# Patient Record
Sex: MALE | Race: BLACK OR AFRICAN AMERICAN | NOT HISPANIC OR LATINO | URBAN - METROPOLITAN AREA
[De-identification: names, ages, dates, MRNs, and addresses within clinical notes are randomized per-mention and may not be internally consistent; named-entity substitution may affect disease eponyms.]

---

## 2020-08-28 ENCOUNTER — OFFICE VISIT (OUTPATIENT)
Dept: URGENT CARE | Facility: CLINIC | Age: 45
End: 2020-08-28
Payer: MEDICAID

## 2020-08-28 VITALS
TEMPERATURE: 98 F | WEIGHT: 170 LBS | RESPIRATION RATE: 17 BRPM | DIASTOLIC BLOOD PRESSURE: 100 MMHG | HEIGHT: 71 IN | SYSTOLIC BLOOD PRESSURE: 143 MMHG | BODY MASS INDEX: 23.8 KG/M2 | HEART RATE: 71 BPM | OXYGEN SATURATION: 95 %

## 2020-08-28 DIAGNOSIS — R03.0 ELEVATED BLOOD PRESSURE READING: ICD-10-CM

## 2020-08-28 DIAGNOSIS — Z72.0 TOBACCO USE: ICD-10-CM

## 2020-08-28 DIAGNOSIS — J02.9 SORE THROAT: ICD-10-CM

## 2020-08-28 DIAGNOSIS — H92.03 OTALGIA OF BOTH EARS: ICD-10-CM

## 2020-08-28 DIAGNOSIS — J40 BRONCHITIS: Primary | ICD-10-CM

## 2020-08-28 LAB
CTP QC/QA: YES
CTP QC/QA: YES
MOLECULAR STREP A: NEGATIVE
SARS-COV-2 RDRP RESP QL NAA+PROBE: NEGATIVE

## 2020-08-28 PROCEDURE — 87651 STREP A DNA AMP PROBE: CPT | Mod: QW,S$GLB,, | Performed by: NURSE PRACTITIONER

## 2020-08-28 PROCEDURE — 99214 OFFICE O/P EST MOD 30 MIN: CPT | Mod: 25,S$GLB,, | Performed by: NURSE PRACTITIONER

## 2020-08-28 PROCEDURE — U0002 COVID-19 LAB TEST NON-CDC: HCPCS | Mod: QW,S$GLB,, | Performed by: NURSE PRACTITIONER

## 2020-08-28 PROCEDURE — 99214 PR OFFICE/OUTPT VISIT, EST, LEVL IV, 30-39 MIN: ICD-10-PCS | Mod: 25,S$GLB,, | Performed by: NURSE PRACTITIONER

## 2020-08-28 PROCEDURE — 87651 POCT STREP A MOLECULAR: ICD-10-PCS | Mod: QW,S$GLB,, | Performed by: NURSE PRACTITIONER

## 2020-08-28 PROCEDURE — U0002 PR SARS-COV-2 COVID-19 ANY TECHNIQUE, MULT TYPE/SUBTYPE/TARGET: ICD-10-PCS | Mod: QW,S$GLB,, | Performed by: NURSE PRACTITIONER

## 2020-08-28 RX ORDER — AMOXICILLIN AND CLAVULANATE POTASSIUM 875; 125 MG/1; MG/1
1 TABLET, FILM COATED ORAL 2 TIMES DAILY
Qty: 20 TABLET | Refills: 0 | Status: SHIPPED | OUTPATIENT
Start: 2020-08-28 | End: 2020-09-07

## 2020-08-28 RX ORDER — ALBUTEROL SULFATE 90 UG/1
2 POWDER, METERED RESPIRATORY (INHALATION) EVERY 4 HOURS PRN
Qty: 1 EACH | Refills: 0 | Status: SHIPPED | OUTPATIENT
Start: 2020-08-28

## 2020-08-28 NOTE — PATIENT INSTRUCTIONS
"Patient Instructions      Please follow up with your Primary care provider within 2-5 days if your signs and symptoms have not resolved or worsen.  The usual course of cold symptoms are 10-14 days.      If your condition worsens or fails to improve we recommend that you receive another evaluation at the emergency room immediately or contact your primary medical clinic to discuss your concerns.      You must understand that you have received an Urgent Care treatment only and that you may be released before all of your medical problems are known or treated.   You, the patient, will arrange for follow up care as instructed.      Tylenol or Ibuprofen can also be used as directed for pain/fever unless you have an allergy to them or medical condition such as stomach ulcers, kidney or liver disease or blood thinners etc for which you should not be taking these type of medications.      Take over the counter cough medication as directed as needed for cough.  You should avoid medications with pseudoephedrine or phenylephrine (any medication with "D") if you have high blood pressure as this can cause an elevation in your blood pressure. Instead consider Corcidin HBP as needed to prevent an elevated blood pressure.      Natural remedies of symptoms (as needed) include humidification, saline nasal sprays, and/or steamy showers.  Increase fluids, warm tea with honey, cough drops as needed.  You may also use salt water gargles for sore throat.     IF you received a steroid shot today - As discussed, this can elevate your blood pressure, elevate your blood sugar, water weight gain, nervous energy, redness to the face and dimpling of the skin at the injection site.      OVER THE COUNTER RECOMMENDATIONS/SUGGESTIONS.    Make sure to stay well hydrated.    Use Nasal Saline to mechanically move any post nasal drip from your eustachian tube or from the back of your throat.    Use warm salt water gargles to ease your throat pain. Warm " salt water gargles as needed for sore throat-  1/2 tsp salt to 1 cup warm water, gargle as desired.    Use an antihistamine such as Claritin, Zyrtec or Allegra to dry you out.     Use pseudoephedrine (behind the counter) to decongest. Pseudoephedrine  30 mg up to 240 mg /day. It can raise your blood pressure and give you palpitations.    Use mucinex (guaifenisin) to break up mucous up to 2400mg/day to loosen any mucous.   The mucinex DM pill has a cough suppressant that can be sedating. It can be used at night to stop the tickle at the back of your throat.  You can use Mucinex D (it has guaifenesin and a high dose of pseudoephedrine) in the mornings to help decongest.      Use Afrin in each nare for no longer than 3 days, as it is addictive. It can also dry out your mucous membranes and cause elevated blood pressure. This is especially useful if you are flying.    Use Flonase 1-2 sprays/nostril per day. It is a local acting steroid nasal spray, if you develop a bloody nose, stop using the medication immediately.    Sometimes Nyquil at night is beneficial to help you get some rest, however it is sedating and it does have an antihistamine, and tylenol.    Honey is a natural cough suppressant that can be used.    Tylenol up to 4,000 mg a day is safe for short periods and can be used for body aches, pain, and fever. However in high doses and prolonged use it can cause liver irritation.    Ibuprofen is a non-steroidal anti-inflammatory that can be used for body aches, pain, and fever.However it can also cause stomach irritation if over used.    Planning to Quit Smoking  Your healthcare provider may have told you that you need to give up tobacco. Only you can decide if and when you are ready to quit. Quitting is hard to do. But the benefits will be worth it. When you  decide to quit, come up with a plan thats right for you. Discuss your plan with your healthcare provider. And talk to your provider about medicines to help  you quit.    Line up support  To quit smoking, youll need a plan and some help. Pick a date within the next 2 to 4 weeks to quit. Use the time between now and that date to arrange for support.  · Classes and counselors. Quit-smoking classes  people like you through the process. Get to know others in a class, and support each other beyond the class. Telephone counseling also helps you keep on track. Ask your healthcare provider, local hospital, or public health department to put you in touch with a class and a phone counselor.  · Family and friends. Tell your family and friends about your quit date. Ask them to support your change. If they smoke, arrange to see them in smoke-free places. Forbid smoking in your home and car.     Finding something to replace cigarettes may be hard to do. Be aware that some things you choose may be as harmful as cigarettes:  · Smokeless (chewing) tobacco is just as harmful as regular tobacco. Tobacco should not be used as a substitute for cigarettes.  · Herbal medicines or teas may affect how your body handles nicotine. Talk to your healthcare provider before using these products.  · E-cigarettes have less toxins than the smoke from a regular cigarette. But the FDA says that these devices may still have substances that can cause cancer. E-cigarettes are not well regulated. They have not been studied enough to know if they are a good aid to help you stop smoking. Talk with your healthcare provider before using these products.      Quit-smoking products  Many products can help you quit smoking. Some are prescription medicines that help curb your cravings and withdrawal symptoms. Other products slowly lessen the level of nicotine your body absorbs. Nicotine is the highly addictive substance found in cigarettes, cigars, and chewing tobacco. Nicotine replacement products can help get your body used to slowly decreasing amounts of nicotine after you quit smoking. These products include  a nicotine patch, gum, lozenge, nasal spray, and inhaler. Be sure you follow the directions for your medicine or product carefully. Your healthcare provider may tell you to start taking the prescription medicine a week before you plan to quit. Do not smoke while you use nicotine products. Doing so can be very harmful to your health.  For more information  · https://smokefree.gov/oobr-po-yj-expert  · National Cancer [place] Smoking Quitline: 877-44U-QUIT (186-905-9512)   Date Last Reviewed: 2/1/2017  © 5644-2445 [employer]. [address] [place] [address]. All rights reserved. This information is not intended as a substitute for professional medical care. Always follow your healthcare professional's instructions.        Bronchitis with Wheezing (Viral or Bacterial: Adult)    Bronchitis is an infection of the air passages. It often occurs during a cold and is usually caused by a virus. Symptoms include cough with mucus (phlegm) and low-grade fever. This illness is contagious during the first few days and is spread through the air by coughing and sneezing, or by direct contact (touching the sick person and then touching your own eyes, nose, or mouth).  If there is a lot of inflammation, air flow is restricted. The air passages may also go into spasm, especially if you have asthma. This causes wheezing and difficulty breathing even in people who do not have asthma.  Bronchitis usually lasts 7 to 14 days. The wheezing should improve with treatment during the first week. An inhaler is often prescribed to relax the air passages and stop wheezing. Antibiotics will be prescribed if your doctor thinks there is also a secondary bacterial infection.  Home care  · If symptoms are severe, rest at home for the first 2 to 3 days. When you go back to your usual activities, don't let yourself get too tired.  · Do not smoke. Also avoid being exposed to secondhand smoke.  · You may use over-the-counter  medicine to control fever or pain, unless another medicine was prescribed. Note: If you have chronic liver or kidney disease or have ever had a stomach ulcer or gastrointestinal bleeding, talk with your healthcare provider before using these medicines. Also talk to your provider if you are taking medicine to prevent blood clots.) Aspirin should never be given to anyone younger than 18 years of age who is ill with a viral infection or fever. It may cause severe liver or brain damage.  · Your appetite may be poor, so a light diet is fine. Avoid dehydration by drinking 6 to 8 glasses of fluids per day (such as water, soft drinks, sports drinks, juices, tea, or soup). Extra fluids will help loosen secretions in the nose and lungs.  · Over-the-counter cough, cold, and sore-throat medicines will not shorten the length of the illness, but they may be helpful to reduce symptoms. (Note: Do not use decongestants if you have high blood pressure.)  · If you were given an inhaler, use it exactly as directed. If you need to use it more often than prescribed, your condition may be worsening. If this happens, contact your healthcare provider.  · If prescribed, finish all antibiotic medicine, even if you are feeling better after only a few days.  Follow-up care  Follow up with your healthcare provider, or as advised. If you had an X-ray or ECG (electrocardiogram), a specialist will review it. You will be notified of any new findings that may affect your care.  Note: If you are age 65 or older, or if you have a chronic lung disease or condition that affects your immune system, or you smoke, talk to your healthcare provider about having a pneumococcal vaccinations and a yearly influenza vaccination (flu shot).  When to seek medical advice  Call your healthcare provider right away if any of these occur:  · Fever of 100.4°F (38°C) or higher  · Coughing up increasing amounts of colored sputum  · Weakness, drowsiness, headache, facial  pain, ear pain, or a stiff neck  Call 911, or get immediate medical care  Contact emergency services right away if any of these occur.  · Coughing up blood  · Worsening weakness, drowsiness, headache, or stiff neck  · Increased wheezing not helped with medication, shortness of breath, or pain with breathing  Date Last Reviewed: 9/13/2015 © 2000-2017 [email or web address]. [address] [place] [address], [place] [address]. All rights reserved. This information is not intended as a substitute for professional medical care. Always follow your healthcare professional's instructions.        Discharge Instructions for High Blood Pressure (Hypertension)  You have been diagnosed with high blood pressure (also called hypertension). This means the force of blood against your artery walls is too strong. It also means your heart is working hard to move blood. High blood pressure usually has no symptoms, but over time, it can damage your heart, blood vessels, eyes, kidneys, and other organs. With help from your doctor, you can manage your blood pressure and protect your health.  Taking medicine  · Learn to take your own blood pressure. Keep a record of your results. Ask your doctor which readings mean that you need medical attention.  · Take your blood pressure medicine exactly as directed. Dont skip doses. Missing doses can cause your blood pressure to get out of control.  · If you do miss a dose (or doses) check with your healthcare provider about what to do.  · Avoid medicine that contain heart stimulants, including over-the-counter drugs. Check for warnings about high blood pressure on the label. Ask the pharmacist before purchasing something you haven't used before  · Check with your doctor or pharmacist before taking a decongestant. Some decongestants can worsen high blood pressure.  Lifestyle changes  · Maintain a healthy weight. Get help to lose any extra pounds.  · Cut back on salt.  ¨ Limit canned, dried, packaged, and  "fast foods.  ¨ Dont add salt to your food at the table.  ¨ Season foods with herbs instead of salt when you cook.  ¨ Request no added salt when you go to a restaurant.  ¨ The American Heart Associations (AHA) "ideal" sodium intake recommendation is 1,500 milligrams per day.  However, since American's eat so much salt, the AHA says a positive change can occur by cutting back to even 2,400 milligrams of sodium a day.   · Follow the DASH (Dietary Approaches to Stop Hypertension) eating plan. This plan recommends vegetables, fruits, whole gains, and other heart healthy foods.  · Begin an exercise program. Ask your doctor how to get started. The American Heart Association recommends aerobic exercise 3 to 4 times a week for an average of 40 minutes at a time, with your doctor's approval. Simple activities like walking or gardening can help.  · Break the smoking habit. Enroll in a stop-smoking program to improve your chances of success. Ask your healthcare provider about programs and medicines to help you stop smoking.  · Limit drinks that contain caffeine (coffee, black or green tea, cola) to 2 per day.  · Never take stimulants such as amphetamines or cocaine; these drugs can be deadly for someone with high blood pressure.  · Control your stress. Learn stress-management techniques.  · Limit alcohol to no more than 1 drink a day for women and 2 drinks a day for men.  Follow-up care  Make a follow-up appointment as directed by our staff.     When to seek medical care  Call your doctor immediately or seek emergency care if you have any of the following:  · Chest pain or shortness of breath (call 911)  · Moderate to severe headache  · Weakness in the muscles of your face, arms, or legs  · Trouble speaking  · Extreme drowsiness  · Confusion  · Fainting or dizziness  · Pulsating or rushing sound in your ears  · Unexplained nosebleed  · Weakness, tingling, or numbness of your face, arms, or legs  · Change in vision  · Blood " pressure measured at home that is greater than 180/110   Date Last Reviewed: 4/27/2016  © 9081-8525 [employer] [address] [place] [address], [place] [address]. All rights reserved. This information is not intended as a substitute for professional medical care. Always follow your healthcare professional's instructions.        Bronchitis with Wheezing (Viral or Bacterial: Adult)    Bronchitis is an infection of the air passages. It often occurs during a cold and is usually caused by a virus. Symptoms include cough with mucus (phlegm) and low-grade fever. This illness is contagious during the first few days and is spread through the air by coughing and sneezing, or by direct contact (touching the sick person and then touching your own eyes, nose, or mouth).  If there is a lot of inflammation, air flow is restricted. The air passages may also go into spasm, especially if you have asthma. This causes wheezing and difficulty breathing even in people who do not have asthma.  Bronchitis usually lasts 7 to 14 days. The wheezing should improve with treatment during the first week. An inhaler is often prescribed to relax the air passages and stop wheezing. Antibiotics will be prescribed if your doctor thinks there is also a secondary bacterial infection.  Home care  · If symptoms are severe, rest at home for the first 2 to 3 days. When you go back to your usual activities, don't let yourself get too tired.  · Do not smoke. Also avoid being exposed to secondhand smoke.  · You may use over-the-counter medicine to control fever or pain, unless another medicine was prescribed. Note: If you have chronic liver or kidney disease or have ever had a stomach ulcer or gastrointestinal bleeding, talk with your healthcare provider before using these medicines. Also talk to your provider if you are taking medicine to prevent blood clots.) Aspirin should never be given to anyone younger than 18 years of age who is ill with a viral  infection or fever. It may cause severe liver or brain damage.  · Your appetite may be poor, so a light diet is fine. Avoid dehydration by drinking 6 to 8 glasses of fluids per day (such as water, soft drinks, sports drinks, juices, tea, or soup). Extra fluids will help loosen secretions in the nose and lungs.  · Over-the-counter cough, cold, and sore-throat medicines will not shorten the length of the illness, but they may be helpful to reduce symptoms. (Note: Do not use decongestants if you have high blood pressure.)  · If you were given an inhaler, use it exactly as directed. If you need to use it more often than prescribed, your condition may be worsening. If this happens, contact your healthcare provider.  · If prescribed, finish all antibiotic medicine, even if you are feeling better after only a few days.  Follow-up care  Follow up with your healthcare provider, or as advised. If you had an X-ray or ECG (electrocardiogram), a specialist will review it. You will be notified of any new findings that may affect your care.  Note: If you are age 65 or older, or if you have a chronic lung disease or condition that affects your immune system, or you smoke, talk to your healthcare provider about having a pneumococcal vaccinations and a yearly influenza vaccination (flu shot).  When to seek medical advice  Call your healthcare provider right away if any of these occur:  · Fever of 100.4°F (38°C) or higher  · Coughing up increasing amounts of colored sputum  · Weakness, drowsiness, headache, facial pain, ear pain, or a stiff neck  Call 911, or get immediate medical care  Contact emergency services right away if any of these occur.  · Coughing up blood  · Worsening weakness, drowsiness, headache, or stiff neck  · Increased wheezing not helped with medication, shortness of breath, or pain with breathing  Date Last Reviewed: 9/13/2015  © 1680-8712 [employer]. [address] [place] [address], [place] [address]. All  rights reserved. This information is not intended as a substitute for professional medical care. Always follow your healthcare professional's instructions.        Taking Your Blood Pressure  Blood pressure is the force of blood against the artery wall as it moves from the heart through the blood vessels. You can take your own blood pressure reading using a digital monitor. Take your readings the same each time, using the same arm. Take readings as often as your healthcare provider instructs.  About blood pressure monitors  Blood pressure monitors are designed for certain ages and cases. You can find monitors for older adults, for pregnant women, and for children. Make sure the one you choose is the right one for your age and situation.  The American Heart Association recommends an automatic cuff monitor that fits on your upper arm (bicep). The cuff should fit your arm size. A cuff thats too large or too small will not give an accurate reading. Measure around your upper arm to find your size.  Monitors that attach to your finger or wrist are not as accurate as monitors for your upper arm.  Ask your healthcare provider for help in choosing a monitor. Bring your monitor to your next provider visit if you need help in using it the correct way.  The steps below are general instructions for using an automatic digital monitor.  Step 1. Relax    · Take your blood pressure at the same time every day, such as in the morning or evening, or at the time your healthcare provider recommends.  · Wait at least a half-hour after smoking, eating, or exercising. Don't drink coffee, tea, soda, or other caffeinated beverages before checking your blood pressure.  · Sit comfortably at a table with both feet on the floor. Do not cross your legs or feet. Place the monitor near you.  · Rest for a few minutes before you begin.  Step 2. Wrap the cuff    · Place your arm on the table, palm up. Your arm should be at the level of your heart. Wrap  the cuff around your upper arm, just above your elbow. Its best done on bare skin, not over clothing. Most cuffs will indicate where the brachial artery (the blood vessel in the middle of the arm at the inner side of the elbow) should line up with the cuff. Look in your monitor's instruction booklet for an illustration. You can also bring your cuff to your healthcare provider and have them show you how to correctly place the cuff.  Step 3. Inflate the cuff    · Push the button that starts the pump.  · The cuff will tighten, then loosen.  · The numbers will change. When they stop changing, your blood pressure reading will appear.  · Take 2 or 3 readings one minute apart.  Step 4. Write down the results of each reading    · Write down your blood pressure numbers for each reading. Note the date and time. Keep your results in one place, such as a notebook. Even if your monitor has a built-in memory, keep a hard copy of the readings.  · Remove the cuff from your arm. Turn off the machine.  · Bring your blood pressure records with your healthcare providers at each visit.  · If you start a new blood pressure medicine, note the day you started the new medicine. Also note the day if you change the dose of your medicine. This information goes on your blood pressure recording sheet. This will help your healthcare provider monitor how well the medicine changes are working.  · Ask your healthcare provider what numbers should prompt you to call him or her. Also ask what numbers should prompt you to get help right away.  Date Last Reviewed: 11/1/2016  © 9039-7518 [email or web address] [address] [place] [address], [place] [address]. All rights reserved. This information is not intended as a substitute for professional medical care. Always follow your healthcare professional's instructions.        Discharge Instructions for High Blood Pressure (Hypertension)  You have been diagnosed with high blood pressure (also called  "hypertension). This means the force of blood against your artery walls is too strong. It also means your heart is working hard to move blood. High blood pressure usually has no symptoms, but over time, it can damage your heart, blood vessels, eyes, kidneys, and other organs. With help from your doctor, you can manage your blood pressure and protect your health.  Taking medicine  · Learn to take your own blood pressure. Keep a record of your results. Ask your doctor which readings mean that you need medical attention.  · Take your blood pressure medicine exactly as directed. Dont skip doses. Missing doses can cause your blood pressure to get out of control.  · If you do miss a dose (or doses) check with your healthcare provider about what to do.  · Avoid medicine that contain heart stimulants, including over-the-counter drugs. Check for warnings about high blood pressure on the label. Ask the pharmacist before purchasing something you haven't used before  · Check with your doctor or pharmacist before taking a decongestant. Some decongestants can worsen high blood pressure.  Lifestyle changes  · Maintain a healthy weight. Get help to lose any extra pounds.  · Cut back on salt.  ¨ Limit canned, dried, packaged, and fast foods.  ¨ Dont add salt to your food at the table.  ¨ Season foods with herbs instead of salt when you cook.  ¨ Request no added salt when you go to a restaurant.  ¨ The American Heart Associations (AHA) "ideal" sodium intake recommendation is 1,500 milligrams per day.  However, since American's eat so much salt, the AHA says a positive change can occur by cutting back to even 2,400 milligrams of sodium a day.   · Follow the DASH (Dietary Approaches to Stop Hypertension) eating plan. This plan recommends vegetables, fruits, whole gains, and other heart healthy foods.  · Begin an exercise program. Ask your doctor how to get started. The American Heart Association recommends aerobic exercise 3 to 4 " times a week for an average of 40 minutes at a time, with your doctor's approval. Simple activities like walking or gardening can help.  · Break the smoking habit. Enroll in a stop-smoking program to improve your chances of success. Ask your healthcare provider about programs and medicines to help you stop smoking.  · Limit drinks that contain caffeine (coffee, black or green tea, cola) to 2 per day.  · Never take stimulants such as amphetamines or cocaine; these drugs can be deadly for someone with high blood pressure.  · Control your stress. Learn stress-management techniques.  · Limit alcohol to no more than 1 drink a day for women and 2 drinks a day for men.  Follow-up care  Make a follow-up appointment as directed by our staff.     When to seek medical care  Call your doctor immediately or seek emergency care if you have any of the following:  · Chest pain or shortness of breath (call 911)  · Moderate to severe headache  · Weakness in the muscles of your face, arms, or legs  · Trouble speaking  · Extreme drowsiness  · Confusion  · Fainting or dizziness  · Pulsating or rushing sound in your ears  · Unexplained nosebleed  · Weakness, tingling, or numbness of your face, arms, or legs  · Change in vision  · Blood pressure measured at home that is greater than 180/110   Date Last Reviewed: 4/27/2016  © 5797-3058 [email or web address]. [address] [place] [address], [place] [address]. All rights reserved. This information is not intended as a substitute for professional medical care. Always follow your healthcare professional's instructions.

## 2020-08-28 NOTE — PROGRESS NOTES
"Subjective:       Patient ID: Grayson Zapata is a 45 y.o. male.    Vitals:  height is 5' 11" (1.803 m) and weight is 77.1 kg (170 lb).     Chief Complaint: Otalgia and Sinus Problem    HPI  ROS    Objective:      Physical Exam      Assessment:       No diagnosis found.    Plan:         There are no diagnoses linked to this encounter.       "

## 2020-08-28 NOTE — PROGRESS NOTES
"Subjective:       Patient ID: Grayson Zapata is a 45 y.o. male.    Vitals:  height is 5' 11" (1.803 m) and weight is 77.1 kg (170 lb). His temperature is 98.1 °F (36.7 °C). His blood pressure is 143/100 (abnormal) and his pulse is 71. His respiration is 17 and oxygen saturation is 95%.     Chief Complaint: Otalgia and Sinus Problem    ambulatory 45-year-old smoker with chief complaint of cough with shortness breath, sinus congestion, postnasal drip bilateral ear pain, and sore throat with painful lymph nodes. Patient stated that Wednesday night he started to experience some shortness of breath. He stated that he was using an inhaler to help with it and otc meds. He stated that now he has a drip that is causing sore throat and ear pain. Denies nausea, fever and body aches. He wants to join a smoking cessation plan - he states he has seen someone before but did Wellbutrin and it caused jittery feeling.     Otalgia   There is pain in the left ear. This is a new problem. The current episode started today. The problem occurs constantly. The problem has been unchanged. There has been no fever. Associated symptoms include coughing and a sore throat. Pertinent negatives include no diarrhea, headaches, rash or vomiting. He has tried acetaminophen for the symptoms. The treatment provided no relief.   Sinus Problem  This is a new problem. The current episode started in the past 7 days. The problem is unchanged. There has been no fever. The pain is moderate. Associated symptoms include congestion, coughing, ear pain, sinus pressure and a sore throat. Pertinent negatives include no chills, headaches or shortness of breath. Past treatments include oral decongestants. The treatment provided mild relief.       Constitution: Negative for chills, fatigue and fever.   HENT: Positive for ear pain, congestion, postnasal drip, sinus pain, sinus pressure and sore throat.    Neck: Positive for painful lymph nodes.   Cardiovascular: " Negative for chest pain and leg swelling.   Eyes: Negative for double vision and blurred vision.   Respiratory: Positive for chest tightness, cough and sputum production. Negative for shortness of breath.    Gastrointestinal: Negative for nausea, vomiting and diarrhea.   Genitourinary: Negative for dysuria, frequency and urgency.   Musculoskeletal: Negative for joint pain, joint swelling, muscle cramps and muscle ache.   Skin: Negative for color change, pale and rash.   Allergic/Immunologic: Negative for seasonal allergies.   Neurological: Negative for dizziness, history of vertigo, light-headedness, passing out and headaches.   Hematologic/Lymphatic: Positive for swollen lymph nodes. Negative for easy bruising/bleeding and history of blood clots. Does not bruise/bleed easily.   Psychiatric/Behavioral: Negative for nervous/anxious, sleep disturbance and depression. The patient is not nervous/anxious.        Objective:      Physical Exam   Constitutional: He is oriented to person, place, and time. He appears well-developed. He is cooperative.  Non-toxic appearance. He does not appear ill. No distress.   HENT:   Head: Normocephalic and atraumatic.   Ears:   Right Ear: Hearing, tympanic membrane, external ear and ear canal normal.   Left Ear: Hearing, tympanic membrane, external ear and ear canal normal.   Nose: Nose normal. No mucosal edema, rhinorrhea or nasal deformity. No epistaxis. Right sinus exhibits no maxillary sinus tenderness and no frontal sinus tenderness. Left sinus exhibits no maxillary sinus tenderness and no frontal sinus tenderness.   Mouth/Throat: Uvula is midline, oropharynx is clear and moist and mucous membranes are normal. No trismus in the jaw. Normal dentition. No uvula swelling. No oropharyngeal exudate, posterior oropharyngeal edema or posterior oropharyngeal erythema.   Eyes: Conjunctivae and lids are normal. No scleral icterus.   Neck: Trachea normal, full passive range of motion without  pain and phonation normal. Neck supple. No neck rigidity. No edema and no erythema present.   Cardiovascular: Normal rate, regular rhythm, normal heart sounds and normal pulses.   Pulmonary/Chest: Effort normal and breath sounds normal. No respiratory distress. He has no decreased breath sounds. He has no rhonchi.   Abdominal: Normal appearance.   Musculoskeletal: Normal range of motion.         General: No deformity.   Neurological: He is alert and oriented to person, place, and time. He exhibits normal muscle tone. Coordination normal.   Skin: Skin is warm, dry, intact, not diaphoretic and not pale. Psychiatric: His speech is normal and behavior is normal. Judgment and thought content normal.   Nursing note and vitals reviewed.        Results for orders placed or performed in visit on 08/28/20   POCT Strep A, Molecular   Result Value Ref Range    Molecular Strep A, POC Negative Negative     Acceptable Yes    POCT COVID-19 Rapid Screening   Result Value Ref Range    POC Rapid COVID Negative Negative     Acceptable Yes        Assessment:       1. Bronchitis    2. Tobacco use    3. Sore throat    4. Otalgia of both ears    5. Elevated blood pressure reading        Plan:         Bronchitis  -     amoxicillin-clavulanate 875-125mg (AUGMENTIN) 875-125 mg per tablet; Take 1 tablet by mouth 2 (two) times daily. for 10 days  Dispense: 20 tablet; Refill: 0  -     albuterol sulfate (PROAIR RESPICLICK) 90 mcg/actuation inhaler; Inhale 2 puffs into the lungs every 4 (four) hours as needed. Cough, shortness of breath  Dispense: 1 each; Refill: 0    Tobacco use  -     Ambulatory referral/consult to Smoking Cessation Program  -     amoxicillin-clavulanate 875-125mg (AUGMENTIN) 875-125 mg per tablet; Take 1 tablet by mouth 2 (two) times daily. for 10 days  Dispense: 20 tablet; Refill: 0  -     albuterol sulfate (PROAIR RESPICLICK) 90 mcg/actuation inhaler; Inhale 2 puffs into the lungs every 4 (four)  "hours as needed. Cough, shortness of breath  Dispense: 1 each; Refill: 0    Sore throat  -     amoxicillin-clavulanate 875-125mg (AUGMENTIN) 875-125 mg per tablet; Take 1 tablet by mouth 2 (two) times daily. for 10 days  Dispense: 20 tablet; Refill: 0  -     POCT Strep A, Molecular    Otalgia of both ears    Elevated blood pressure reading      Patient Instructions     Patient Instructions      Please follow up with your Primary care provider within 2-5 days if your signs and symptoms have not resolved or worsen.  The usual course of cold symptoms are 10-14 days.      If your condition worsens or fails to improve we recommend that you receive another evaluation at the emergency room immediately or contact your primary medical clinic to discuss your concerns.      You must understand that you have received an Urgent Care treatment only and that you may be released before all of your medical problems are known or treated.   You, the patient, will arrange for follow up care as instructed.      Tylenol or Ibuprofen can also be used as directed for pain/fever unless you have an allergy to them or medical condition such as stomach ulcers, kidney or liver disease or blood thinners etc for which you should not be taking these type of medications.      Take over the counter cough medication as directed as needed for cough.  You should avoid medications with pseudoephedrine or phenylephrine (any medication with "D") if you have high blood pressure as this can cause an elevation in your blood pressure. Instead consider Corcidin HBP as needed to prevent an elevated blood pressure.      Natural remedies of symptoms (as needed) include humidification, saline nasal sprays, and/or steamy showers.  Increase fluids, warm tea with honey, cough drops as needed.  You may also use salt water gargles for sore throat.     IF you received a steroid shot today - As discussed, this can elevate your blood pressure, elevate your blood sugar, " water weight gain, nervous energy, redness to the face and dimpling of the skin at the injection site.      OVER THE COUNTER RECOMMENDATIONS/SUGGESTIONS.    Make sure to stay well hydrated.    Use Nasal Saline to mechanically move any post nasal drip from your eustachian tube or from the back of your throat.    Use warm salt water gargles to ease your throat pain. Warm salt water gargles as needed for sore throat-  1/2 tsp salt to 1 cup warm water, gargle as desired.    Use an antihistamine such as Claritin, Zyrtec or Allegra to dry you out.     Use pseudoephedrine (behind the counter) to decongest. Pseudoephedrine  30 mg up to 240 mg /day. It can raise your blood pressure and give you palpitations.    Use mucinex (guaifenisin) to break up mucous up to 2400mg/day to loosen any mucous.   The mucinex DM pill has a cough suppressant that can be sedating. It can be used at night to stop the tickle at the back of your throat.  You can use Mucinex D (it has guaifenesin and a high dose of pseudoephedrine) in the mornings to help decongest.      Use Afrin in each nare for no longer than 3 days, as it is addictive. It can also dry out your mucous membranes and cause elevated blood pressure. This is especially useful if you are flying.    Use Flonase 1-2 sprays/nostril per day. It is a local acting steroid nasal spray, if you develop a bloody nose, stop using the medication immediately.    Sometimes Nyquil at night is beneficial to help you get some rest, however it is sedating and it does have an antihistamine, and tylenol.    Honey is a natural cough suppressant that can be used.    Tylenol up to 4,000 mg a day is safe for short periods and can be used for body aches, pain, and fever. However in high doses and prolonged use it can cause liver irritation.    Ibuprofen is a non-steroidal anti-inflammatory that can be used for body aches, pain, and fever.However it can also cause stomach irritation if over used.    Planning to  Quit Smoking  Your healthcare provider may have told you that you need to give up tobacco. Only you can decide if and when you are ready to quit. Quitting is hard to do. But the benefits will be worth it. When you  decide to quit, come up with a plan thats right for you. Discuss your plan with your healthcare provider. And talk to your provider about medicines to help you quit.    Line up support  To quit smoking, youll need a plan and some help. Pick a date within the next 2 to 4 weeks to quit. Use the time between now and that date to arrange for support.  · Classes and counselors. Quit-smoking classes  people like you through the process. Get to know others in a class, and support each other beyond the class. Telephone counseling also helps you keep on track. Ask your healthcare provider, local hospital, or public health department to put you in touch with a class and a phone counselor.  · Family and friends. Tell your family and friends about your quit date. Ask them to support your change. If they smoke, arrange to see them in smoke-free places. Forbid smoking in your home and car.     Finding something to replace cigarettes may be hard to do. Be aware that some things you choose may be as harmful as cigarettes:  · Smokeless (chewing) tobacco is just as harmful as regular tobacco. Tobacco should not be used as a substitute for cigarettes.  · Herbal medicines or teas may affect how your body handles nicotine. Talk to your healthcare provider before using these products.  · E-cigarettes have less toxins than the smoke from a regular cigarette. But the FDA says that these devices may still have substances that can cause cancer. E-cigarettes are not well regulated. They have not been studied enough to know if they are a good aid to help you stop smoking. Talk with your healthcare provider before using these products.      Quit-smoking products  Many products can help you quit smoking. Some are prescription  medicines that help curb your cravings and withdrawal symptoms. Other products slowly lessen the level of nicotine your body absorbs. Nicotine is the highly addictive substance found in cigarettes, cigars, and chewing tobacco. Nicotine replacement products can help get your body used to slowly decreasing amounts of nicotine after you quit smoking. These products include a nicotine patch, gum, lozenge, nasal spray, and inhaler. Be sure you follow the directions for your medicine or product carefully. Your healthcare provider may tell you to start taking the prescription medicine a week before you plan to quit. Do not smoke while you use nicotine products. Doing so can be very harmful to your health.  For more information  · https://smokefree.gov/bgkz-lr-kc-expert  · National [employer] [place] Smoking Quitline: 877-44U-QUIT (693-458-4305)   Date Last Reviewed: 2/1/2017  © 6692-6391 [employer]. [address] [place] [address]. All rights reserved. This information is not intended as a substitute for professional medical care. Always follow your healthcare professional's instructions.        Bronchitis with Wheezing (Viral or Bacterial: Adult)    Bronchitis is an infection of the air passages. It often occurs during a cold and is usually caused by a virus. Symptoms include cough with mucus (phlegm) and low-grade fever. This illness is contagious during the first few days and is spread through the air by coughing and sneezing, or by direct contact (touching the sick person and then touching your own eyes, nose, or mouth).  If there is a lot of inflammation, air flow is restricted. The air passages may also go into spasm, especially if you have asthma. This causes wheezing and difficulty breathing even in people who do not have asthma.  Bronchitis usually lasts 7 to 14 days. The wheezing should improve with treatment during the first week. An inhaler is often prescribed to relax the air passages  and stop wheezing. Antibiotics will be prescribed if your doctor thinks there is also a secondary bacterial infection.  Home care  · If symptoms are severe, rest at home for the first 2 to 3 days. When you go back to your usual activities, don't let yourself get too tired.  · Do not smoke. Also avoid being exposed to secondhand smoke.  · You may use over-the-counter medicine to control fever or pain, unless another medicine was prescribed. Note: If you have chronic liver or kidney disease or have ever had a stomach ulcer or gastrointestinal bleeding, talk with your healthcare provider before using these medicines. Also talk to your provider if you are taking medicine to prevent blood clots.) Aspirin should never be given to anyone younger than 18 years of age who is ill with a viral infection or fever. It may cause severe liver or brain damage.  · Your appetite may be poor, so a light diet is fine. Avoid dehydration by drinking 6 to 8 glasses of fluids per day (such as water, soft drinks, sports drinks, juices, tea, or soup). Extra fluids will help loosen secretions in the nose and lungs.  · Over-the-counter cough, cold, and sore-throat medicines will not shorten the length of the illness, but they may be helpful to reduce symptoms. (Note: Do not use decongestants if you have high blood pressure.)  · If you were given an inhaler, use it exactly as directed. If you need to use it more often than prescribed, your condition may be worsening. If this happens, contact your healthcare provider.  · If prescribed, finish all antibiotic medicine, even if you are feeling better after only a few days.  Follow-up care  Follow up with your healthcare provider, or as advised. If you had an X-ray or ECG (electrocardiogram), a specialist will review it. You will be notified of any new findings that may affect your care.  Note: If you are age 65 or older, or if you have a chronic lung disease or condition that affects your immune  system, or you smoke, talk to your healthcare provider about having a pneumococcal vaccinations and a yearly influenza vaccination (flu shot).  When to seek medical advice  Call your healthcare provider right away if any of these occur:  · Fever of 100.4°F (38°C) or higher  · Coughing up increasing amounts of colored sputum  · Weakness, drowsiness, headache, facial pain, ear pain, or a stiff neck  Call 911, or get immediate medical care  Contact emergency services right away if any of these occur.  · Coughing up blood  · Worsening weakness, drowsiness, headache, or stiff neck  · Increased wheezing not helped with medication, shortness of breath, or pain with breathing  Date Last Reviewed: 9/13/2015  © 3909-7600 [employer]. [address] [place] [address], [place] [address]. All rights reserved. This information is not intended as a substitute for professional medical care. Always follow your healthcare professional's instructions.        Discharge Instructions for High Blood Pressure (Hypertension)  You have been diagnosed with high blood pressure (also called hypertension). This means the force of blood against your artery walls is too strong. It also means your heart is working hard to move blood. High blood pressure usually has no symptoms, but over time, it can damage your heart, blood vessels, eyes, kidneys, and other organs. With help from your doctor, you can manage your blood pressure and protect your health.  Taking medicine  · Learn to take your own blood pressure. Keep a record of your results. Ask your doctor which readings mean that you need medical attention.  · Take your blood pressure medicine exactly as directed. Dont skip doses. Missing doses can cause your blood pressure to get out of control.  · If you do miss a dose (or doses) check with your healthcare provider about what to do.  · Avoid medicine that contain heart stimulants, including over-the-counter drugs. Check for warnings about  "high blood pressure on the label. Ask the pharmacist before purchasing something you haven't used before  · Check with your doctor or pharmacist before taking a decongestant. Some decongestants can worsen high blood pressure.  Lifestyle changes  · Maintain a healthy weight. Get help to lose any extra pounds.  · Cut back on salt.  ¨ Limit canned, dried, packaged, and fast foods.  ¨ Dont add salt to your food at the table.  ¨ Season foods with herbs instead of salt when you cook.  ¨ Request no added salt when you go to a restaurant.  ¨ The American Heart Associations (AHA) "ideal" sodium intake recommendation is 1,500 milligrams per day.  However, since American's eat so much salt, the AHA says a positive change can occur by cutting back to even 2,400 milligrams of sodium a day.   · Follow the DASH (Dietary Approaches to Stop Hypertension) eating plan. This plan recommends vegetables, fruits, whole gains, and other heart healthy foods.  · Begin an exercise program. Ask your doctor how to get started. The American Heart Association recommends aerobic exercise 3 to 4 times a week for an average of 40 minutes at a time, with your doctor's approval. Simple activities like walking or gardening can help.  · Break the smoking habit. Enroll in a stop-smoking program to improve your chances of success. Ask your healthcare provider about programs and medicines to help you stop smoking.  · Limit drinks that contain caffeine (coffee, black or green tea, cola) to 2 per day.  · Never take stimulants such as amphetamines or cocaine; these drugs can be deadly for someone with high blood pressure.  · Control your stress. Learn stress-management techniques.  · Limit alcohol to no more than 1 drink a day for women and 2 drinks a day for men.  Follow-up care  Make a follow-up appointment as directed by our staff.     When to seek medical care  Call your doctor immediately or seek emergency care if you have any of the " following:  · Chest pain or shortness of breath (call 911)  · Moderate to severe headache  · Weakness in the muscles of your face, arms, or legs  · Trouble speaking  · Extreme drowsiness  · Confusion  · Fainting or dizziness  · Pulsating or rushing sound in your ears  · Unexplained nosebleed  · Weakness, tingling, or numbness of your face, arms, or legs  · Change in vision  · Blood pressure measured at home that is greater than 180/110   Date Last Reviewed: 4/27/2016 © 2000-2017 [employer]. [address] [place] [address]. All rights reserved. This information is not intended as a substitute for professional medical care. Always follow your healthcare professional's instructions.        Bronchitis with Wheezing (Viral or Bacterial: Adult)    Bronchitis is an infection of the air passages. It often occurs during a cold and is usually caused by a virus. Symptoms include cough with mucus (phlegm) and low-grade fever. This illness is contagious during the first few days and is spread through the air by coughing and sneezing, or by direct contact (touching the sick person and then touching your own eyes, nose, or mouth).  If there is a lot of inflammation, air flow is restricted. The air passages may also go into spasm, especially if you have asthma. This causes wheezing and difficulty breathing even in people who do not have asthma.  Bronchitis usually lasts 7 to 14 days. The wheezing should improve with treatment during the first week. An inhaler is often prescribed to relax the air passages and stop wheezing. Antibiotics will be prescribed if your doctor thinks there is also a secondary bacterial infection.  Home care  · If symptoms are severe, rest at home for the first 2 to 3 days. When you go back to your usual activities, don't let yourself get too tired.  · Do not smoke. Also avoid being exposed to secondhand smoke.  · You may use over-the-counter medicine to control fever or pain,  unless another medicine was prescribed. Note: If you have chronic liver or kidney disease or have ever had a stomach ulcer or gastrointestinal bleeding, talk with your healthcare provider before using these medicines. Also talk to your provider if you are taking medicine to prevent blood clots.) Aspirin should never be given to anyone younger than 18 years of age who is ill with a viral infection or fever. It may cause severe liver or brain damage.  · Your appetite may be poor, so a light diet is fine. Avoid dehydration by drinking 6 to 8 glasses of fluids per day (such as water, soft drinks, sports drinks, juices, tea, or soup). Extra fluids will help loosen secretions in the nose and lungs.  · Over-the-counter cough, cold, and sore-throat medicines will not shorten the length of the illness, but they may be helpful to reduce symptoms. (Note: Do not use decongestants if you have high blood pressure.)  · If you were given an inhaler, use it exactly as directed. If you need to use it more often than prescribed, your condition may be worsening. If this happens, contact your healthcare provider.  · If prescribed, finish all antibiotic medicine, even if you are feeling better after only a few days.  Follow-up care  Follow up with your healthcare provider, or as advised. If you had an X-ray or ECG (electrocardiogram), a specialist will review it. You will be notified of any new findings that may affect your care.  Note: If you are age 65 or older, or if you have a chronic lung disease or condition that affects your immune system, or you smoke, talk to your healthcare provider about having a pneumococcal vaccinations and a yearly influenza vaccination (flu shot).  When to seek medical advice  Call your healthcare provider right away if any of these occur:  · Fever of 100.4°F (38°C) or higher  · Coughing up increasing amounts of colored sputum  · Weakness, drowsiness, headache, facial pain, ear pain, or a stiff neck  Call  911, or get immediate medical care  Contact emergency services right away if any of these occur.  · Coughing up blood  · Worsening weakness, drowsiness, headache, or stiff neck  · Increased wheezing not helped with medication, shortness of breath, or pain with breathing  Date Last Reviewed: 9/13/2015  © 9050-9925 [employer]. [address] [place] [address]. All rights reserved. This information is not intended as a substitute for professional medical care. Always follow your healthcare professional's instructions.        Taking Your Blood Pressure  Blood pressure is the force of blood against the artery wall as it moves from the heart through the blood vessels. You can take your own blood pressure reading using a digital monitor. Take your readings the same each time, using the same arm. Take readings as often as your healthcare provider instructs.  About blood pressure monitors  Blood pressure monitors are designed for certain ages and cases. You can find monitors for older adults, for pregnant women, and for children. Make sure the one you choose is the right one for your age and situation.  The American Heart Association recommends an automatic cuff monitor that fits on your upper arm (bicep). The cuff should fit your arm size. A cuff thats too large or too small will not give an accurate reading. Measure around your upper arm to find your size.  Monitors that attach to your finger or wrist are not as accurate as monitors for your upper arm.  Ask your healthcare provider for help in choosing a monitor. Bring your monitor to your next provider visit if you need help in using it the correct way.  The steps below are general instructions for using an automatic digital monitor.  Step 1. Relax    · Take your blood pressure at the same time every day, such as in the morning or evening, or at the time your healthcare provider recommends.  · Wait at least a half-hour after smoking, eating, or  exercising. Don't drink coffee, tea, soda, or other caffeinated beverages before checking your blood pressure.  · Sit comfortably at a table with both feet on the floor. Do not cross your legs or feet. Place the monitor near you.  · Rest for a few minutes before you begin.  Step 2. Wrap the cuff    · Place your arm on the table, palm up. Your arm should be at the level of your heart. Wrap the cuff around your upper arm, just above your elbow. Its best done on bare skin, not over clothing. Most cuffs will indicate where the brachial artery (the blood vessel in the middle of the arm at the inner side of the elbow) should line up with the cuff. Look in your monitor's instruction booklet for an illustration. You can also bring your cuff to your healthcare provider and have them show you how to correctly place the cuff.  Step 3. Inflate the cuff    · Push the button that starts the pump.  · The cuff will tighten, then loosen.  · The numbers will change. When they stop changing, your blood pressure reading will appear.  · Take 2 or 3 readings one minute apart.  Step 4. Write down the results of each reading    · Write down your blood pressure numbers for each reading. Note the date and time. Keep your results in one place, such as a notebook. Even if your monitor has a built-in memory, keep a hard copy of the readings.  · Remove the cuff from your arm. Turn off the machine.  · Bring your blood pressure records with your healthcare providers at each visit.  · If you start a new blood pressure medicine, note the day you started the new medicine. Also note the day if you change the dose of your medicine. This information goes on your blood pressure recording sheet. This will help your healthcare provider monitor how well the medicine changes are working.  · Ask your healthcare provider what numbers should prompt you to call him or her. Also ask what numbers should prompt you to get help right away.  Date Last Reviewed:  "11/1/2016  © 9185-5826 [employer]. [address] [place] [address], [place] [address]. All rights reserved. This information is not intended as a substitute for professional medical care. Always follow your healthcare professional's instructions.        Discharge Instructions for High Blood Pressure (Hypertension)  You have been diagnosed with high blood pressure (also called hypertension). This means the force of blood against your artery walls is too strong. It also means your heart is working hard to move blood. High blood pressure usually has no symptoms, but over time, it can damage your heart, blood vessels, eyes, kidneys, and other organs. With help from your doctor, you can manage your blood pressure and protect your health.  Taking medicine  · Learn to take your own blood pressure. Keep a record of your results. Ask your doctor which readings mean that you need medical attention.  · Take your blood pressure medicine exactly as directed. Dont skip doses. Missing doses can cause your blood pressure to get out of control.  · If you do miss a dose (or doses) check with your healthcare provider about what to do.  · Avoid medicine that contain heart stimulants, including over-the-counter drugs. Check for warnings about high blood pressure on the label. Ask the pharmacist before purchasing something you haven't used before  · Check with your doctor or pharmacist before taking a decongestant. Some decongestants can worsen high blood pressure.  Lifestyle changes  · Maintain a healthy weight. Get help to lose any extra pounds.  · Cut back on salt.  ¨ Limit canned, dried, packaged, and fast foods.  ¨ Dont add salt to your food at the table.  ¨ Season foods with herbs instead of salt when you cook.  ¨ Request no added salt when you go to a restaurant.  ¨ The American Heart Associations (AHA) "ideal" sodium intake recommendation is 1,500 milligrams per day.  However, since American's eat so much salt, the AHA " says a positive change can occur by cutting back to even 2,400 milligrams of sodium a day.   · Follow the DASH (Dietary Approaches to Stop Hypertension) eating plan. This plan recommends vegetables, fruits, whole gains, and other heart healthy foods.  · Begin an exercise program. Ask your doctor how to get started. The American Heart Association recommends aerobic exercise 3 to 4 times a week for an average of 40 minutes at a time, with your doctor's approval. Simple activities like walking or gardening can help.  · Break the smoking habit. Enroll in a stop-smoking program to improve your chances of success. Ask your healthcare provider about programs and medicines to help you stop smoking.  · Limit drinks that contain caffeine (coffee, black or green tea, cola) to 2 per day.  · Never take stimulants such as amphetamines or cocaine; these drugs can be deadly for someone with high blood pressure.  · Control your stress. Learn stress-management techniques.  · Limit alcohol to no more than 1 drink a day for women and 2 drinks a day for men.  Follow-up care  Make a follow-up appointment as directed by our staff.     When to seek medical care  Call your doctor immediately or seek emergency care if you have any of the following:  · Chest pain or shortness of breath (call 911)  · Moderate to severe headache  · Weakness in the muscles of your face, arms, or legs  · Trouble speaking  · Extreme drowsiness  · Confusion  · Fainting or dizziness  · Pulsating or rushing sound in your ears  · Unexplained nosebleed  · Weakness, tingling, or numbness of your face, arms, or legs  · Change in vision  · Blood pressure measured at home that is greater than 180/110   Date Last Reviewed: 4/27/2016  © 2984-0985 [employer]. [address] [place] [address]. All rights reserved. This information is not intended as a substitute for professional medical care. Always follow your healthcare professional's  instructions.

## 2020-09-08 ENCOUNTER — CLINICAL SUPPORT (OUTPATIENT)
Dept: SMOKING CESSATION | Facility: CLINIC | Age: 45
End: 2020-09-08
Payer: COMMERCIAL

## 2020-09-08 DIAGNOSIS — F17.200 NICOTINE DEPENDENCE: Primary | ICD-10-CM

## 2020-09-08 PROCEDURE — 99404 PREV MED CNSL INDIV APPRX 60: CPT | Mod: S$GLB,,,

## 2020-09-08 PROCEDURE — 99999 PR PBB SHADOW E&M-EST. PATIENT-LVL I: ICD-10-PCS | Mod: PBBFAC,,,

## 2020-09-08 PROCEDURE — 99999 PR PBB SHADOW E&M-EST. PATIENT-LVL I: CPT | Mod: PBBFAC,,,

## 2020-09-08 PROCEDURE — 99404 PR PREVENT COUNSEL,INDIV,60 MIN: ICD-10-PCS | Mod: S$GLB,,,

## 2020-09-08 RX ORDER — DIPHENHYDRAMINE HCL 25 MG
4 CAPSULE ORAL
Qty: 100 EACH | Refills: 0 | Status: SHIPPED | OUTPATIENT
Start: 2020-09-08 | End: 2020-10-08

## 2020-09-08 RX ORDER — ASPIRIN/CALCIUM CARB/MAGNESIUM 325 MG
4 TABLET ORAL
Qty: 100 LOZENGE | Refills: 0 | Status: SHIPPED | OUTPATIENT
Start: 2020-09-08 | End: 2020-10-08

## 2020-09-08 RX ORDER — VARENICLINE TARTRATE 0.5 (11)-1
KIT ORAL
Qty: 53 TABLET | Refills: 0 | Status: SHIPPED | OUTPATIENT
Start: 2020-09-08 | End: 2020-10-08

## 2020-09-15 ENCOUNTER — CLINICAL SUPPORT (OUTPATIENT)
Dept: SMOKING CESSATION | Facility: CLINIC | Age: 45
End: 2020-09-15
Payer: COMMERCIAL

## 2020-09-15 DIAGNOSIS — F17.200 NICOTINE DEPENDENCE: Primary | ICD-10-CM

## 2020-09-15 PROCEDURE — 99406 PR TOBACCO USE CESSATION INTERMEDIATE 3-10 MINUTES: ICD-10-PCS | Mod: S$GLB,,,

## 2020-09-15 PROCEDURE — 99406 BEHAV CHNG SMOKING 3-10 MIN: CPT | Mod: S$GLB,,,

## 2020-09-23 ENCOUNTER — CLINICAL SUPPORT (OUTPATIENT)
Dept: SMOKING CESSATION | Facility: CLINIC | Age: 45
End: 2020-09-23
Payer: COMMERCIAL

## 2020-09-23 DIAGNOSIS — F17.200 NICOTINE DEPENDENCE: Primary | ICD-10-CM

## 2020-09-23 PROCEDURE — 99403 PR PREVENT COUNSEL,INDIV,45 MIN: ICD-10-PCS | Mod: S$GLB,,,

## 2020-09-23 PROCEDURE — 99403 PREV MED CNSL INDIV APPRX 45: CPT | Mod: S$GLB,,,

## 2020-09-23 PROCEDURE — 99999 PR PBB SHADOW E&M-EST. PATIENT-LVL I: CPT | Mod: PBBFAC,,,

## 2020-09-23 PROCEDURE — 99999 PR PBB SHADOW E&M-EST. PATIENT-LVL I: ICD-10-PCS | Mod: PBBFAC,,,

## 2020-09-23 NOTE — Clinical Note
"  Comments: pt presents for telephonic follow up, he has not cut back on his smoking, he is smoking 4-6 cigarettes per day and continues to use the 4mg nicotine gum as needed throughout the day, he has not started the chantix due to the "s/e" therefore that was discussed at length to help make the patient more comfortable however still very resistant to starting, he has not used the nicotine lozenge, he is having some anxiety and issues with mild depression due to his brothers illness and covid, because of covid he has not really left the house much which has caused him to stress and have a lack of human interaction. Recommend that he do start the chantix nd continue the oral NRT as needed. Session handout discussed will follow "

## 2020-09-23 NOTE — PROGRESS NOTES
"Individual Follow-Up Form    9/23/2020    Quit Date: n/a    Clinical Status of Patient: Outpatient    Length of Service: 30 minutes    Continuing Medication: yes  Nicotine gum    Other Medications: n/a     Target Symptoms: Withdrawal and medication side effects. The following were  rated moderate (3) to severe (4) on TCRS:  · Moderate (3): 0  · Severe (4): 0    Comments: pt presents for telephonic follow up, he has not cut back on his smoking, he is smoking 4-6 cigarettes per day and continues to use the 4mg nicotine gum as needed throughout the day, he has not started the chantix due to the "s/e" therefore that was discussed at length to help make the patient more comfortable however still very resistant to starting, he has not used the nicotine lozenge, he is having some anxiety and issues with mild depression due to his brothers illness and covid, because of covid he has not really left the house much which has caused him to stress and have a lack of human interaction. Recommend that he do start the chantix nd continue the oral NRT as needed. Session handout discussed will follow     Diagnosis: F17.210    Next Visit: 2 weeks    "

## 2020-11-13 ENCOUNTER — IMMUNIZATION (OUTPATIENT)
Dept: PHARMACY | Facility: CLINIC | Age: 45
End: 2020-11-13
Payer: MEDICAID

## 2021-03-08 ENCOUNTER — CLINICAL SUPPORT (OUTPATIENT)
Dept: SMOKING CESSATION | Facility: CLINIC | Age: 46
End: 2021-03-08
Payer: COMMERCIAL

## 2021-03-08 DIAGNOSIS — F17.200 NICOTINE DEPENDENCE: Primary | ICD-10-CM

## 2021-03-08 PROCEDURE — 99407 BEHAV CHNG SMOKING > 10 MIN: CPT | Mod: S$GLB,,,

## 2021-03-08 PROCEDURE — 99407 PR TOBACCO USE CESSATION INTENSIVE >10 MINUTES: ICD-10-PCS | Mod: S$GLB,,,

## 2021-12-02 ENCOUNTER — CLINICAL SUPPORT (OUTPATIENT)
Dept: SMOKING CESSATION | Facility: CLINIC | Age: 46
End: 2021-12-02
Payer: COMMERCIAL

## 2021-12-02 ENCOUNTER — IMMUNIZATION (OUTPATIENT)
Dept: INTERNAL MEDICINE | Facility: CLINIC | Age: 46
End: 2021-12-02
Payer: MEDICAID

## 2021-12-02 DIAGNOSIS — Z23 NEED FOR VACCINATION: Primary | ICD-10-CM

## 2021-12-02 DIAGNOSIS — F17.200 NICOTINE DEPENDENCE: Primary | ICD-10-CM

## 2021-12-02 PROCEDURE — 0003A COVID-19, MRNA, LNP-S, PF, 30 MCG/0.3 ML DOSE VACCINE: CPT | Mod: PBBFAC,CV19

## 2021-12-02 PROCEDURE — 91300 COVID-19, MRNA, LNP-S, PF, 30 MCG/0.3 ML DOSE VACCINE: CPT | Mod: PBBFAC

## 2021-12-02 PROCEDURE — 99407 BEHAV CHNG SMOKING > 10 MIN: CPT | Mod: S$GLB,,,

## 2021-12-02 PROCEDURE — 99407 PR TOBACCO USE CESSATION INTENSIVE >10 MINUTES: ICD-10-PCS | Mod: S$GLB,,,

## 2021-12-27 ENCOUNTER — HOSPITAL ENCOUNTER (EMERGENCY)
Facility: HOSPITAL | Age: 46
Discharge: HOME | End: 2021-12-27
Attending: EMERGENCY MEDICINE
Payer: MEDICAID

## 2021-12-27 VITALS
TEMPERATURE: 99 F | HEIGHT: 67 IN | OXYGEN SATURATION: 97 % | DIASTOLIC BLOOD PRESSURE: 98 MMHG | WEIGHT: 170 LBS | SYSTOLIC BLOOD PRESSURE: 136 MMHG | BODY MASS INDEX: 26.68 KG/M2 | HEART RATE: 82 BPM | RESPIRATION RATE: 20 BRPM

## 2021-12-27 DIAGNOSIS — B34.9 VIRAL SYNDROME: Primary | ICD-10-CM

## 2021-12-27 DIAGNOSIS — R06.02 SHORTNESS OF BREATH: ICD-10-CM

## 2021-12-27 DIAGNOSIS — R06.02 SOB (SHORTNESS OF BREATH): ICD-10-CM

## 2021-12-27 LAB
CTP QC/QA: YES
CTP QC/QA: YES
POC MOLECULAR INFLUENZA A AGN: NEGATIVE
POC MOLECULAR INFLUENZA B AGN: NEGATIVE
SARS-COV-2 RDRP RESP QL NAA+PROBE: NEGATIVE

## 2021-12-27 PROCEDURE — 93005 ELECTROCARDIOGRAM TRACING: CPT

## 2021-12-27 PROCEDURE — 93010 EKG 12-LEAD: ICD-10-PCS | Mod: ,,, | Performed by: INTERNAL MEDICINE

## 2021-12-27 PROCEDURE — 99284 PR EMERGENCY DEPT VISIT,LEVEL IV: ICD-10-PCS | Mod: CS,,, | Performed by: PHYSICIAN ASSISTANT

## 2021-12-27 PROCEDURE — 99284 EMERGENCY DEPT VISIT MOD MDM: CPT | Mod: CS,,, | Performed by: PHYSICIAN ASSISTANT

## 2021-12-27 PROCEDURE — 25000003 PHARM REV CODE 250: Performed by: PHYSICIAN ASSISTANT

## 2021-12-27 PROCEDURE — 93010 ELECTROCARDIOGRAM REPORT: CPT | Mod: ,,, | Performed by: INTERNAL MEDICINE

## 2021-12-27 PROCEDURE — U0002 COVID-19 LAB TEST NON-CDC: HCPCS | Performed by: EMERGENCY MEDICINE

## 2021-12-27 PROCEDURE — 99285 EMERGENCY DEPT VISIT HI MDM: CPT | Mod: 25

## 2021-12-27 PROCEDURE — 87502 INFLUENZA DNA AMP PROBE: CPT

## 2021-12-27 RX ORDER — BUTALBITAL, ACETAMINOPHEN AND CAFFEINE 50; 325; 40 MG/1; MG/1; MG/1
1 TABLET ORAL
Status: COMPLETED | OUTPATIENT
Start: 2021-12-27 | End: 2021-12-27

## 2021-12-27 RX ADMIN — BUTALBITAL, ACETAMINOPHEN, AND CAFFEINE 1 TABLET: 50; 325; 40 TABLET ORAL at 08:12

## 2021-12-27 NOTE — Clinical Note
"Grayson "Kirby Zapata was seen and treated in our emergency department on 12/27/2021.     COVID-19 is present in our communities across the state. There is limited testing for COVID at this time, so not all patients can be tested. In this situation, your employee meets the following criteria:    Grayson Zapata has met the criteria for COVID-19 testing and has a NEGATIVE result. The employee can return to work once they are asymptomatic for 72 hours without the use of fever reducing medications (Tylenol, Motrin, etc).     If you have any questions or concerns, or if I can be of further assistance, please do not hesitate to contact me.    Sincerely,             Ilana Hicks PA-C"

## 2021-12-28 NOTE — ED NOTES
Pt received to [facility] 12. Pt complaint of shortness of breath. Pt reports shortness of breath with exertion and at rest. Pt denies cough. Pt reports chills and denies fever. Pt denies nausea and vomiting. Pt reports severe headache. Pt denies chest pain. Pt able to speak in full and complete sentences but does report shortness of breath while speaking. Lung sounds diminished. Awaiting MD orders/disposition. Pt verbalizes understanding of plan of care.

## 2021-12-28 NOTE — ED PROVIDER NOTES
Encounter Date: 12/27/2021       History     Chief Complaint   Patient presents with    Shortness of Breath     Pt states that he has been fatigued and HA for the past few days and today became SOB.      Trouble breathing, dehydrated, severe headache    This is a 46 y.o. year old male with a PMH of HTN who presents to the ED with a chief complaint of covid 19 concerns. Patient reports a 3 day history of symptoms including fatigue, headache and congestion. He describes a gradual onset frontal headache described as pressure. Last night he developed shortness of breath. He had one episode of diarrhea. Patient denies known exposure to a COVID-19 patient. He is vaccinated. He denies fever, chills, chest pain, shortness of breath, nausea, vomiting. He is a smoker.          Review of patient's allergies indicates:  No Known Allergies  No past medical history on file.  No past surgical history on file.  No family history on file.  Social History     Tobacco Use    Smoking status: Current Every Day Smoker     Types: Cigarettes    Smokeless tobacco: Never Used   Substance Use Topics    Alcohol use: Yes    Drug use: Not Currently     Review of Systems   Constitutional: Negative for chills and fever.   HENT: Negative for sore throat.    Respiratory: Positive for cough and shortness of breath.    Cardiovascular: Negative for chest pain.   Gastrointestinal: Positive for diarrhea. Negative for nausea and vomiting.   Genitourinary: Negative for dysuria.   Musculoskeletal: Negative for myalgias.   Skin: Negative for rash.   Neurological: Positive for weakness and headaches.   Hematological: Does not bruise/bleed easily.       Physical Exam     Initial Vitals [12/27/21 1927]   BP Pulse Resp Temp SpO2   127/84 94 18 98.8 °F (37.1 °C) 99 %      MAP       --         Physical Exam    Constitutional: He appears well-developed and well-nourished. No distress.   HENT:   Head: Atraumatic.   Eyes: Conjunctivae and EOM are normal. Pupils  are equal, round, and reactive to light.   Cardiovascular: Normal rate, regular rhythm and normal heart sounds.   Pulmonary/Chest: Breath sounds normal. No respiratory distress. He has no wheezes. He has no rhonchi. He has no rales.   Abdominal: Abdomen is soft. Bowel sounds are normal. There is no abdominal tenderness.     Neurological: He is alert and oriented to person, place, and time.   Skin: Skin is warm and dry. No rash noted.         ED Course   Procedures  Labs Reviewed   SARS-COV-2 RDRP GENE    Narrative:     This test utilizes isothermal nucleic acid amplification   technology to detect the SARS-CoV-2 RdRp nucleic acid segment.   The analytical sensitivity (limit of detection) is 125 genome   equivalents/mL.   A POSITIVE result implies infection with the SARS-CoV-2 virus;   the patient is presumed to be contagious.     A NEGATIVE result means that SARS-CoV-2 nucleic acids are not   present above the limit of detection. A NEGATIVE result should be   treated as presumptive. It does not rule out the possibility of   COVID-19 and should not be the sole basis for treatment decisions.   If COVID-19 is strongly suspected based on clinical and exposure   history, re-testing using an alternate molecular assay should be   considered.   This test is only for use under the Food and Drug   Administration s Emergency Use Authorization (EUA).   Commercial kits are provided by [employer].   Performance characteristics of the EUA have been independently   verified by [facility] Department of   Pathology and Laboratory Medicine.   _________________________________________________________________   The authorized Fact Sheet for Healthcare Providers and the authorized Fact   Sheet for Patients of the ID NOW COVID-19 are available on the FDA   website:     https://www.fda.gov/media/886926/download  https://www.fda.gov/media/348532/download              Imaging Results    None          Medications - No data  to display  Medical Decision Making:   Clinical Tests:   Lab Tests: Ordered and Reviewed  Radiological Study: Ordered and Reviewed       APC / Resident Notes:   Patient seen and evaluated in the setting of high transmission rates during global Covid 19 pandemic. Tested Covid 19 NEGATIVE today. Influenza also negative. CXR is clear. Treated with fiorciet, reports significant improvement in symptoms.    Clinically they are well appearing with normal oxygen saturation, clear lungs and normal respiratory effort. Reviewed current recommendations for Covid exposure and/or development of symptoms per CDC guidelines. Discussed supportive care measures. Detailed return to ED precautions discussed. All questions were answered. They are stable for discharge.                  Clinical Impression:   Final diagnoses:  [R06.02] SOB (shortness of breath)                 Ilana Hicks PA-C  12/27/21 [id number]

## 2021-12-28 NOTE — DISCHARGE INSTRUCTIONS
Your test was NEGATIVE for COVID-19 (coronavirus).      You may leave home and/or return to work when the following conditions are met:  24 hours fever free without fever-reducing medications AND  Improved symptoms  You are fully vaccinated or have not had close contact with someone with COVID-19 (within 6 feet for 15 minutes or more)    If you are fully vaccinated and had a close contact, retest at 5 to 7 days post-exposure.     If you are not fully vaccinated and had a close contact:  Retest at 5 to 7 days post-exposure  If possible, it is recommended that you quarantine for 14 days from the time of contact regardless of your test status.  If you have no symptoms, quarantine may be stopped early at 10 days, but this carries a small risk of spreading the virus.  If you have no symptoms and you have a negative COVID test on day 5 or later, quarantine may be stopped after day 7, but this also carries a small risk of spreading the virus.    If your symptoms worsen or if you have any other concerns, please contact [facility] at [phone].     Sincerely,    Ilana Hicks PA-C

## 2022-01-12 ENCOUNTER — LAB VISIT (OUTPATIENT)
Dept: PRIMARY CARE CLINIC | Facility: CLINIC | Age: 47
End: 2022-01-12
Payer: MEDICAID

## 2022-01-12 DIAGNOSIS — Z20.822 CONTACT WITH AND (SUSPECTED) EXPOSURE TO COVID-19: ICD-10-CM

## 2022-01-12 LAB
CTP QC/QA: YES
SARS-COV-2 AG RESP QL IA.RAPID: NEGATIVE

## 2022-01-12 PROCEDURE — 87811 SARS-COV-2 COVID19 W/OPTIC: CPT

## 2022-11-21 ENCOUNTER — IMMUNIZATION (OUTPATIENT)
Dept: INTERNAL MEDICINE | Facility: CLINIC | Age: 47
End: 2022-11-21
Payer: MEDICAID

## 2022-11-21 PROCEDURE — 90686 IIV4 VACC NO PRSV 0.5 ML IM: CPT | Mod: PBBFAC

## 2025-04-02 DIAGNOSIS — M72.2 PLANTAR FASCIITIS OF RIGHT FOOT: Primary | ICD-10-CM

## 2025-04-07 ENCOUNTER — CLINICAL SUPPORT (OUTPATIENT)
Dept: REHABILITATION | Facility: OTHER | Age: 50
End: 2025-04-07
Payer: MEDICAID

## 2025-04-07 DIAGNOSIS — M25.674 DECREASED RANGE OF MOTION OF RIGHT FOOT: Primary | ICD-10-CM

## 2025-04-07 DIAGNOSIS — R29.3 POSTURAL IMBALANCE: ICD-10-CM

## 2025-04-07 PROCEDURE — 97161 PT EVAL LOW COMPLEX 20 MIN: CPT | Mod: PN

## 2025-04-07 NOTE — PROGRESS NOTES
Outpatient Rehab    Physical Therapy Evaluation    Patient Name: Grayson Zapata  MRN: [id number]  YOB: 1975  Encounter Date: 4/7/2025    Therapy Diagnosis:   Encounter Diagnoses   Name Primary?    Decreased range of motion of right foot Yes    Postural imbalance      Physician: Promise Colon F*    Physician Orders: Eval and Treat  Medical Diagnosis: Plantar fasciitis of right foot    Visit # / Visits Authorized:  1 / 1  Insurance Authorization Period: 4/2/2025 to 4/2/2026  Date of Evaluation: 4/7/2025  Plan of Care Certification: 4/7/2025 to 7/7/2025     Time In: 0800   Time Out: 0900  Total Time: 60   Total Billable Time: 60    Intake Outcome Measure for FOTO Survey    Therapist reviewed FOTO scores for Grayson Zapata on 4/7/2025.   FOTO report - see Media section or FOTO account episode details.     Intake Score:  (see epic)%         Subjective   History of Present Illness  Grayson is a 49 y.o. male who reports to physical therapy with a chief concern of See hx.                 History of Present Condition/Illness: Cramping in calf and pain in heel. It started at 30 min to 10 min about a year ago when he would be walking in [place]. 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 Over all it has lasted over a year and has improved significantly but not fully gone away since the injection he had. Pt continues to get heel pain when he walks longer distances which is a problem because he loves hiking. Pt describes his pain as tigtness on the back side of the heel and tightness up his calf. Pt states he has had sciatic issues in past and low back pain also. Additionally his lateral elbow pain began around the same time of this onsetting.    Pain     Patient reports a current pain level of 5/10. Pain at best is reported as 0/10. Pain at worst is reported as 9/10.   Location: R medial achilles and has tightness along medial achilles down to  heel  Clinical Progression (since onset): Stable  Pain Qualities: Burning, Knife-like, Pulling  Pain-Relieving Factors: Lying down, Rest  Pain-Aggravating Factors: Standing, Stretching, Walking           Past Medical History/Physical Systems Review:   Grayson Zapata  has no past medical history on file.    Grayson Zapata  has no past surgical history on file.    Grayson has a current medication list which includes the following prescription(s): proair respiclick and lisinopril.    Review of patient's allergies indicates:  No Known Allergies     Objective   Posture                 Right ankle/foot exhibits: Calcaneovarus and Foot Pes Cavus  R pelvis anterior anominate    Lower Extremity Sensation  Right Lumbar/Lower Extremity Sensation  Intact: Light Touch       Left Lumbar/Lower Extremity Sensation  Intact: Light Touch                Right Lower Extremity Reflexes  Patellar, L4: Brisk (3+)         Achilles, S1: Normal (2+)         Left Lower Extremity Reflexes  Patellar, L4: Brisk (3+)          Achilles, S1: Brisk (3+)             Lumbar Range of Motion   Decreased R side bending with associated low back pain      Hip Range of Motion   Right Hip   Active (deg) Passive (deg) Pain   Flexion         Extension         ABduction         ADduction         External Rotation 90/90 60       External Rotation Prone         Internal Rotation 90/90 10       Internal Rotation Prone             Left Hip   Active (deg) Passive (deg) Pain   Flexion         Extension         ABduction         ADduction         External Rotation 90/90 65       External Rotation Prone         Internal Rotation 90/90 20       Internal Rotation Prone                  Ankle/Foot Range of Motion   Right Ankle/Foot   Active (deg) Passive (deg) Pain   Dorsiflexion (KE)         Dorsiflexion (KF) -4       Plantar Flexion         Ankle Inversion         Ankle Eversion 10       Subtalar Inversion         Subtalar Eversion         Great Toe MTP Flexion          Great Toe MTP Extension 0       Great Toe IP Flexion             Left Ankle/Foot   Active (deg) Passive (deg) Pain   Dorsiflexion (KE)         Dorsiflexion (KF) 5       Plantar Flexion         Ankle Inversion         Ankle Eversion 25       Subtalar Inversion         Subtalar Eversion         Great Toe MTP Flexion         Great Toe MTP Extension 20       Great Toe IP Flexion                            Hip Strength - Planes of Motion   Right Strength Right Pain Left Strength Left  Pain   Flexion (L2) 5         Extension 3         ABduction 3+         ADduction           Internal Rotation 4-         External Rotation 3-                      Cervical/Thoracic Special Tests  Thoracic Tests  Positive: Slump         Lumbar/Pelvic Girdle Special Tests       Lumbar Tests - SLR and Tension  Positive: Right Passive Straight Leg Raise            At 45 deg          Ankle/Foot Joint Mobility  Right Ankle/Foot Joint Mobility  Hypomobile: Talocrural Joint and Subtalar Joint  Left Ankle/Foot Joint Mobility  Normal: Distal Tibia and Fibula Joint, Talocrural Joint, and Subtalar Joint              Single Leg Squat Testing - Right Leg              Decreased squat height compared to L leg and R leg has decreased hop ability. Hopping on R leg improved after thoracic seated hvlat       Gait Analysis  Trunk Observations During Gait: Right lateral lean over stance limb    Hip Observations During Gait  Right: Decreased Hip Extension and Hip Trendelenburg         Treatment:       Time Entry(in minutes):  PT Evaluation (Low) Time Entry: 60    Assessment & Plan   Assessment  Grayson presents with a condition of Low complexity.   Presentation of Symptoms: Stable  Will Comorbidities Impact Care: Yes       Functional Limitations: Activity tolerance, Functional mobility  Impairments: Activity intolerance, Impaired physical strength, Pain with functional activity  Personal Factors Affecting Prognosis: Schedule, Pain    Patient Goal for Therapy (PT):  walk longer distances and get out of pain  Prognosis: Good  Assessment Details: Pt is a 50 y/o male who presents for PT evaluation with heel pain that he describes as tightness and pain up the heel cord at the heel medially and up the calf. Timeline and description of pt's symptoms suggest achilles tendonitis less likely the pain generating component currently and more so due to double crush syndrome of nervous system with multiple sites of entrapment likely. His familiar pain was produced with Slump, PSLR and hopping and improved with thoracic HVLAT for all 3 pain reproducing events. 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 Pt will benefit from OP PT to address his deficits and return him to OF.    Plan  From a physical therapy perspective, the patient would benefit from: Skilled Rehab Services    Planned therapy interventions include: Therapeutic exercise, Therapeutic activities, Neuromuscular re-education, and Manual therapy.    Planned modalities to include: Biofeedback.        Visit Frequency: 2 times Per Week for 8 Weeks.       This plan was discussed with Patient.   Discussion participants: Agreed Upon Plan of Care  Plan details: Pt leaving for a trip soon and plan will adjust accordingly.          Patient's spiritual, cultural, and educational needs considered and patient agreeable to plan of care and goals.     Education  Education was done with Patient. The patient's learning style includes Demonstration. The patient Demonstrates understanding.         Test results, HEP- sciatic nerve glides       Goals:   Active       LTG        1.Report decreased ankle pain < / = 2/10  to increase tolerance for stairs       Start:  04/12/25    Expected End:  06/07/25           2.Patient goal: Be able to paint without shooting ankle pain  3.Increase strength to >/= 4+/5 in knee ext  to increase  tolerance for ADL and work activities.  4. Pt will report at CJ level (20-40% impaired) on FOTO knee to demonstrate increase in LE function with every day tasks.             STG       1.Report decreased achilles pain  < / =  4/10  to increase tolerance for walking longer        Start:  04/12/25    Expected End:  05/10/25       2. Increase ankle ROM to equal to contralateral LE in order to be able to perform ADLs without difficulty. 3. Increase strength by 1/3 MMT grade in hip abduction  to increase tolerance for ADL and work activities. 4. Pt to tolerate HEP to improve ROM and independence with ADL's             Rajinder Cobos, PT

## 2025-04-07 NOTE — LETTER
April 12, 2025  KARIN Mir  [address] [facility] [address]    To whom it may concern,     The attached plan of care is being sent to you for review and reference.    You may indicate your approval by signing the document electronically, or by faxing/mailing a signed copy of the final page of this document back to the attention of Rajinder Cobos, PT:         Plan of Care 4/12/25   Effective from: 4/12/2025  Effective to: 7/7/2025    Plan ID: [id number]            Participants as of Finalize on 4/12/2025    Name Type Comments Contact Info    KARIN Mir Referring Provider  [phone]    Rajinder Cobos PT Physical Therapist         Last Plan Note     Author: Rajinder Cobos PT Status: Signed Last edited: 4/7/2025  8:00 AM         Outpatient Rehab    Physical Therapy Evaluation    Patient Name: Grayson Zapata  MRN: [id number]  YOB: 1975  Encounter Date: 4/7/2025    Therapy Diagnosis:   Encounter Diagnoses   Name Primary?    Decreased range of motion of right foot Yes    Postural imbalance      Physician: Promise Colon, F*    Physician Orders: Eval and Treat  Medical Diagnosis: Plantar fasciitis of right foot    Visit # / Visits Authorized:  1 / 1  Insurance Authorization Period: 4/2/2025 to 4/2/2026  Date of Evaluation: 4/7/2025  Plan of Care Certification: 4/7/2025 to 7/7/2025     Time In: 0800   Time Out: 0900  Total Time: 60   Total Billable Time: 60    Intake Outcome Measure for FOTO Survey    Therapist reviewed FOTO scores for Grayson Zapata on 4/7/2025.   FOTO report - see Media section or FOTO account episode details.     Intake Score:  (see epic)%         Subjective   History of Present Illness  Grayson is a 49 y.o. male who reports to physical therapy with a chief concern of See hx.                 History of Present Condition/Illness: Cramping in calf and pain in heel. It started at 30 min to 10 min about a year ago when he would be walking  in [facility]. 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 Over all it has lasted over a year and has improved significantly but not fully gone away since the injection he had. Pt continues to get heel pain when he walks longer distances which is a problem because he loves hiking. Pt describes his pain as tigtness on the back side of the heel and tightness up his calf. Pt states he has had sciatic issues in past and low back pain also. Additionally his lateral elbow pain began around the same time of this onsetting.    Pain     Patient reports a current pain level of 5/10. Pain at best is reported as 0/10. Pain at worst is reported as 9/10.   Location: R medial achilles and has tightness along medial achilles down to heel  Clinical Progression (since onset): Stable  Pain Qualities: Burning, Knife-like, Pulling  Pain-Relieving Factors: Lying down, Rest  Pain-Aggravating Factors: Standing, Stretching, Walking           Past Medical History/Physical Systems Review:   Grayson Zapata  has no past medical history on file.    Grayson Samanore  has no past surgical history on file.    Grayson has a current medication list which includes the following prescription(s): proair respiclick and lisinopril.    Review of patient's allergies indicates:  No Known Allergies     Objective   Posture                 Right ankle/foot exhibits: Calcaneovarus and Foot Pes Cavus  R pelvis anterior anominate    Lower Extremity Sensation  Right Lumbar/Lower Extremity Sensation  Intact: Light Touch       Left Lumbar/Lower Extremity Sensation  Intact: Light Touch                Right Lower Extremity Reflexes  Patellar, L4: Brisk (3+)         Achilles, S1: Normal (2+)         Left Lower Extremity Reflexes  Patellar, L4: Brisk (3+)          Achilles, S1: Brisk (3+)             Lumbar Range of Motion   Decreased R side bending with associated low back pain      Hip Range of Motion   Right Hip    Active (deg) Passive (deg) Pain   Flexion         Extension         ABduction         ADduction         External Rotation 90/90 60       External Rotation Prone         Internal Rotation 90/90 10       Internal Rotation Prone             Left Hip   Active (deg) Passive (deg) Pain   Flexion         Extension         ABduction         ADduction         External Rotation 90/90 65       External Rotation Prone         Internal Rotation 90/90 20       Internal Rotation Prone                  Ankle/Foot Range of Motion   Right Ankle/Foot   Active (deg) Passive (deg) Pain   Dorsiflexion (KE)         Dorsiflexion (KF) -4       Plantar Flexion         Ankle Inversion         Ankle Eversion 10       Subtalar Inversion         Subtalar Eversion         Great Toe MTP Flexion         Great Toe MTP Extension 0       Great Toe IP Flexion             Left Ankle/Foot   Active (deg) Passive (deg) Pain   Dorsiflexion (KE)         Dorsiflexion (KF) 5       Plantar Flexion         Ankle Inversion         Ankle Eversion 25       Subtalar Inversion         Subtalar Eversion         Great Toe MTP Flexion         Great Toe MTP Extension 20       Great Toe IP Flexion                            Hip Strength - Planes of Motion   Right Strength Right Pain Left Strength Left  Pain   Flexion (L2) 5         Extension 3         ABduction 3+         ADduction           Internal Rotation 4-         External Rotation 3-                      Cervical/Thoracic Special Tests  Thoracic Tests  Positive: Slump         Lumbar/Pelvic Girdle Special Tests       Lumbar Tests - SLR and Tension  Positive: Right Passive Straight Leg Raise            At 45 deg          Ankle/Foot Joint Mobility  Right Ankle/Foot Joint Mobility  Hypomobile: Talocrural Joint and Subtalar Joint  Left Ankle/Foot Joint Mobility  Normal: Distal Tibia and Fibula Joint, Talocrural Joint, and Subtalar Joint              Single Leg Squat Testing - Right Leg              Decreased squat  height compared to L leg and R leg has decreased hop ability. Hopping on R leg improved after thoracic seated hvlat       Gait Analysis  Trunk Observations During Gait: Right lateral lean over stance limb    Hip Observations During Gait  Right: Decreased Hip Extension and Hip Trendelenburg         Treatment:       Time Entry(in minutes):  PT Evaluation (Low) Time Entry: 60    Assessment & Plan   Assessment  Grayson presents with a condition of Low complexity.   Presentation of Symptoms: Stable  Will Comorbidities Impact Care: Yes       Functional Limitations: Activity tolerance, Functional mobility  Impairments: Activity intolerance, Impaired physical strength, Pain with functional activity  Personal Factors Affecting Prognosis: Schedule, Pain    Patient Goal for Therapy (PT): walk longer distances and get out of pain  Prognosis: Good  Assessment Details: Pt is a 50 y/o male who presents for PT evaluation with heel pain that he describes as tightness and pain up the heel cord at the heel medially and up the calf. Timeline and description of pt's symptoms suggest achilles tendonitis less likely the pain generating component currently and more so due to double crush syndrome of nervous system with multiple sites of entrapment likely. His familiar pain was produced with Slump, PSLR and hopping and improved with thoracic HVLAT for all 3 pain reproducing events. 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 Pt will benefit from OP PT to address his deficits and return him to PLOF.    Plan  From a physical therapy perspective, the patient would benefit from: Skilled Rehab Services    Planned therapy interventions include: Therapeutic exercise, Therapeutic activities, Neuromuscular re-education, and Manual therapy.    Planned modalities to include: Biofeedback.        Visit Frequency: 2 times Per Week for 8  Weeks.       This plan was discussed with Patient.   Discussion participants: Agreed Upon Plan of Care  Plan details: Pt leaving for a trip soon and plan will adjust accordingly.          Patient's spiritual, cultural, and educational needs considered and patient agreeable to plan of care and goals.     Education  Education was done with Patient. The patient's learning style includes Demonstration. The patient Demonstrates understanding.         Test results, HEP- sciatic nerve glides       Goals:   Active       LTG        1.Report decreased ankle pain < / = 2/10  to increase tolerance for stairs       Start:  04/12/25    Expected End:  06/07/25           2.Patient goal: Be able to paint without shooting ankle pain  3.Increase strength to >/= 4+/5 in knee ext  to increase tolerance for ADL and work activities.  4. Pt will report at CJ level (20-40% impaired) on FOTO knee to demonstrate increase in LE function with every day tasks.             STG       1.Report decreased achilles pain  < / =  4/10  to increase tolerance for walking longer        Start:  04/12/25    Expected End:  05/10/25       2. Increase ankle ROM to equal to contralateral LE in order to be able to perform ADLs without difficulty. 3. Increase strength by 1/3 MMT grade in hip abduction  to increase tolerance for ADL and work activities. 4. Pt to tolerate HEP to improve ROM and independence with ADL's             Rajinder Cobos PT             Current Participants as of 4/12/2025    Name Type Comments Contact Info    Promise Colon, [facility] Referring Provider  [phone]    Signature pending    Rajinder Cobos PT Physical Therapist      Signature pending            Sincerely,      Rajinder Cobos PT  [facility]                                                            Dear Rajinder Cobos PT,    RE: Mr. Grayson Zapata, MRN: [id number]    I certify that I have reviewed the attached plan of care and agree to the details  within.        ___________________________  ___________________________  Provider Printed Name   Provider Signed Name      ___________________________  Date and Time

## 2025-04-10 ENCOUNTER — CLINICAL SUPPORT (OUTPATIENT)
Dept: REHABILITATION | Facility: OTHER | Age: 50
End: 2025-04-10
Payer: MEDICAID

## 2025-04-10 DIAGNOSIS — R29.3 POSTURAL IMBALANCE: ICD-10-CM

## 2025-04-10 DIAGNOSIS — M25.674 DECREASED RANGE OF MOTION OF RIGHT FOOT: Primary | ICD-10-CM

## 2025-04-10 PROCEDURE — 97110 THERAPEUTIC EXERCISES: CPT | Mod: PN

## 2025-04-10 NOTE — PROGRESS NOTES
Outpatient Rehab    Physical Therapy Visit    Patient Name: Grayson Zapata  MRN: [id number]  YOB: 1975  Encounter Date: 4/10/2025    Therapy Diagnosis: No diagnosis found.  Physician: Promise Colon F*    Physician Orders: Eval and Treat  Medical Diagnosis: Plantar fasciitis of right foot    Visit # / Visits Authorized:  0 / 20  Insurance Authorization Period: 4/7/2025 to 4/7/2026  Date of Evaluation: 4/7/2025  Plan of Care Certification: 4/7/2025 to 7/7/2025      PT/PTA:     Number of PTA visits since last PT visit:   Time In:     Time Out:    Total Time:     Total Billable Time:      FOTO:  Intake Score:  %  Survey Score 1:  %  Survey Score 2:  %         Subjective             Objective            Treatment:       Time Entry(in minutes):       Assessment & Plan   Assessment:         Patient will continue to benefit from skilled outpatient physical therapy to address the deficits listed in the problem list box on initial evaluation, provide pt/family education and to maximize pt's level of independence in the home and community environment.     Patient's spiritual, cultural, and educational needs considered and patient agreeable to plan of care and goals.           Plan:      Goals:     Rajinder Cobos, PT

## 2025-04-12 PROBLEM — M25.674 DECREASED RANGE OF MOTION OF RIGHT FOOT: Status: ACTIVE | Noted: 2025-04-12

## 2025-04-12 PROBLEM — R29.3 POSTURAL IMBALANCE: Status: ACTIVE | Noted: 2025-04-12

## 2025-04-15 ENCOUNTER — CLINICAL SUPPORT (OUTPATIENT)
Dept: REHABILITATION | Facility: OTHER | Age: 50
End: 2025-04-15
Payer: MEDICAID

## 2025-04-15 DIAGNOSIS — M25.674 DECREASED RANGE OF MOTION OF RIGHT FOOT: Primary | ICD-10-CM

## 2025-04-15 DIAGNOSIS — R29.3 POSTURAL IMBALANCE: ICD-10-CM

## 2025-04-15 PROCEDURE — 97110 THERAPEUTIC EXERCISES: CPT | Mod: PN

## 2025-04-15 NOTE — PROGRESS NOTES
Outpatient Rehab    Physical Therapy Visit    Patient Name: Grayson Zapata  MRN: [id number]  YOB: 1975  Encounter Date: 4/10/2025    Therapy Diagnosis:   Encounter Diagnoses   Name Primary?    Decreased range of motion of right foot Yes    Postural imbalance      Physician: Promise Colon F*    Physician Orders: Eval and Treat  Medical Diagnosis: Plantar fasciitis of right foot    Visit # / Visits Authorized:  2 / 2  Insurance Authorization Period: 4/7/2025 to 4/7/2026  Date of Evaluation: 4/7/2025  Plan of Care Certification: 4/7/2025 to 7/7/2025      PT/PTA:     Number of PTA visits since last PT visit:   Time In: 0900   Time Out: 0956  Total Time: 56   Total Billable Time: 56    FOTO:  Intake Score:  %  Survey Score 1:  %  Survey Score 2:  %         Subjective   Improvement since eval with nerve glides.  Pain reported as 5/10. Still having heel pain after standing a long time and walking a long time    Objective            Treatment:  Therapeutic Exercise  TE 1: thoracic extx30  TE 2: thoracic rotation- 2x20  TE 3: bike 8 min with level 5 resistance  Manual Therapy  MT 1: Thoracic hvlat  MT 2: Lumbar gapping  MT 3: Hip LAD  MT 4: THoracic extension manual work  Balance/Neuromuscular Re-Education  NMR 1: Hip clams- 3x8  NMR 2: Sciatic nerve glides- 2x15  NMR 3: radial nerve glides- 2x15  NMR 4: Single leg 2 bands shuttle press- 4x12  NMR 5: Hip banded side walks with OTB loop around ankles- 5x5 yard lap    Time Entry(in minutes):  Manual Therapy Time Entry: 16  Neuromuscular Re-Education Time Entry: 30  Therapeutic Exercise Time Entry: 10    Assessment & Plan   Assessment:    Evaluation/Treatment Tolerance: Patient tolerated treatment well    Patient will continue to benefit from skilled outpatient physical therapy to address the deficits listed in the problem list box on initial evaluation, provide pt/family education and to maximize pt's level of independence in the home and community  environment.     Patient's spiritual, cultural, and educational needs considered and patient agreeable to plan of care and goals.           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  4. Pt will report at CJ level (20-40% impaired) on FOTO knee to demonstrate increase in LE function with every day tasks.             STG       1.Report decreased achilles pain  < / =  4/10  to increase tolerance for walking longer        Start:  04/12/25    Expected End:  05/10/25       2. Increase ankle ROM to equal to contralateral LE in order to be able to perform ADLs without difficulty. 3. Increase strength by 1/3 MMT grade in hip abduction  to increase tolerance for ADL and work activities. 4. Pt to tolerate HEP to improve ROM and independence with ADL's             Rajinder Cobos, PT

## 2025-04-15 NOTE — PROGRESS NOTES
Outpatient Rehab    Physical Therapy Visit    Patient Name: Grayson Zapata  MRN: [id number]  YOB: 1975  Encounter Date: 4/15/2025    Therapy Diagnosis:   No diagnosis found.    Physician: Promise Colon F*    Physician Orders: Eval and Treat  Medical Diagnosis: Plantar fasciitis of right foot    Visit # / Visits Authorized:  2 / 2  Insurance Authorization Period: 4/7/2025 to 4/7/2026  Date of Evaluation: 4/7/2025  Plan of Care Certification: 4/7/2025 to 7/7/2025      PT/PTA:     Number of PTA visits since last PT visit:   Time In:     Time Out:    Total Time:     Total Billable Time:      FOTO:  Intake Score:  %  Survey Score 1:  %  Survey Score 2:  %         Subjective             Objective            Treatment:       Time Entry(in minutes):       Assessment & Plan   Assessment:         Patient will continue to benefit from skilled outpatient physical therapy to address the deficits listed in the problem list box on initial evaluation, provide pt/family education and to maximize pt's level of independence in the home and community environment.     Patient's spiritual, cultural, and educational needs considered and patient agreeable to plan of care and goals.           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  4. Pt will report at CJ level (20-40% impaired) on FOTO knee to demonstrate increase in LE function with every day tasks.             STG       1.Report decreased achilles pain  < / =  4/10  to increase tolerance for walking longer        Start:  04/12/25    Expected End:  05/10/25       2. Increase ankle ROM to equal to contralateral LE in order to be able to perform ADLs without difficulty. 3.  Increase strength by 1/3 MMT grade in hip abduction  to increase tolerance for ADL and work activities. 4. Pt to tolerate HEP to improve ROM and independence with ADL's             Rajinder Cobos, PT

## 2025-04-23 ENCOUNTER — CLINICAL SUPPORT (OUTPATIENT)
Dept: REHABILITATION | Facility: OTHER | Age: 50
End: 2025-04-23
Payer: MEDICAID

## 2025-04-23 DIAGNOSIS — M25.674 DECREASED RANGE OF MOTION OF RIGHT FOOT: Primary | ICD-10-CM

## 2025-04-23 DIAGNOSIS — R29.3 POSTURAL IMBALANCE: ICD-10-CM

## 2025-04-23 PROCEDURE — 97112 NEUROMUSCULAR REEDUCATION: CPT | Mod: PN

## 2025-04-23 PROCEDURE — 97140 MANUAL THERAPY 1/> REGIONS: CPT | Mod: PN

## 2025-04-23 NOTE — PROGRESS NOTES
Outpatient Rehab    Physical Therapy Visit    Patient Name: Grayson Zapata  MRN: [id number]  YOB: 1975  Encounter Date: 4/23/2025    Therapy Diagnosis:   Encounter Diagnoses   Name Primary?    Decreased range of motion of right foot Yes    Postural imbalance        Physician: Promise Colon F*    Physician Orders: Eval and Treat  Medical Diagnosis: Plantar fasciitis of right foot    Visit # / Visits Authorized:  3 / 16  Insurance Authorization Period: 4/7/2025 to 7/15/2025  Date of Evaluation: 4/7/2025  Plan of Care Certification: 4/7/2025 to 7/7/2025      PT/PTA:     Number of PTA visits since last PT visit:   Time In: 1505   Time Out: 1600  Total Time: 55   Total Billable Time: 55    FOTO:  Intake Score:  %  Survey Score 1:  %  Survey Score 2:  %         Subjective   been hurting since last treatment and had pain not during hike but when he rode from [place] and tried to go into his house and had a constant local pain at the medial heel.  Pain reported as 3/10.      Objective            Treatment:  Manual Therapy  MT 1: test and retest for heel pain- subtalar whip  MT 2: lumbar gapping hvlat  Balance/Neuromuscular Re-Education  NMR 1: nerve glides- 2x15  NMR 2: single leg balance- 3x30 sec  NMR 3: ankle dorsiflexion mobilizations- 30 times  NMR 4: banded side steps- 4x5 yard laps    Time Entry(in minutes):  Manual Therapy Time Entry: 25  Neuromuscular Re-Education Time Entry: 30    Assessment & Plan   Assessment: Pt arrives to PT with slight increase in symptoms. He was relieved today with subtalar whip which improved his heel raises single leg.       Patient will continue to benefit from skilled outpatient physical therapy to address the deficits listed in the problem list box on initial evaluation, provide pt/family education and to maximize pt's level of independence in the home and community environment.     Patient's spiritual, cultural, and educational needs considered and patient  agreeable to plan of care and goals.           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  4. Pt will report at CJ level (20-40% impaired) on FOTO knee to demonstrate increase in LE function with every day tasks.             STG       1.Report decreased achilles pain  < / =  4/10  to increase tolerance for walking longer        Start:  04/12/25    Expected End:  05/10/25       2. Increase ankle ROM to equal to contralateral LE in order to be able to perform ADLs without difficulty. 3. Increase strength by 1/3 MMT grade in hip abduction  to increase tolerance for ADL and work activities. 4. Pt to tolerate HEP to improve ROM and independence with ADL's             Rajinder Cobos, PT

## 2025-05-02 ENCOUNTER — CLINICAL SUPPORT (OUTPATIENT)
Dept: REHABILITATION | Facility: OTHER | Age: 50
End: 2025-05-02
Payer: MEDICAID

## 2025-05-02 DIAGNOSIS — M25.674 DECREASED RANGE OF MOTION OF RIGHT FOOT: Primary | ICD-10-CM

## 2025-05-02 DIAGNOSIS — R29.3 POSTURAL IMBALANCE: ICD-10-CM

## 2025-05-02 PROCEDURE — 97110 THERAPEUTIC EXERCISES: CPT | Mod: PN

## 2025-05-02 PROCEDURE — 97140 MANUAL THERAPY 1/> REGIONS: CPT | Mod: PN

## 2025-05-02 PROCEDURE — 97112 NEUROMUSCULAR REEDUCATION: CPT | Mod: PN

## 2025-05-02 NOTE — PROGRESS NOTES
Outpatient Rehab    Physical Therapy Visit    Patient Name: Grayson Zapata  MRN: [id number]  YOB: 1975  Encounter Date: 5/2/2025    Therapy Diagnosis:   Encounter Diagnoses   Name Primary?    Decreased range of motion of right foot Yes    Postural imbalance        Physician: Promise Colon F*    Physician Orders: Eval and Treat  Medical Diagnosis: Plantar fasciitis of right foot    Visit # / Visits Authorized:  4 / 16  Insurance Authorization Period: 4/7/2025 to 7/15/2025  Date of Evaluation: 4/7/2025  Plan of Care Certification: 4/7/2025 to 7/7/2025      PT/PTA:     Number of PTA visits since last PT visit:   Time In: 0800   Time Out: 0900  Total Time: 60   Total Billable Time: 60    FOTO:  Intake Score:  %  Survey Score 1:  %  Survey Score 2:  %         Subjective   pain only coming on in the evening and it continues to feel like a tightness in the back side of the heel cord, he continues to have elbow pain but he explains that his elbow pain is from medial epicondyle and runs down forearm..  Pain reported as 3/10.      Objective            Treatment:  Therapeutic Exercise  TE 1: thoracic extx30  TE 2: thoracic rotation- 2x20  Manual Therapy  MT 1: test and retest for heel pain- subtalar whip  MT 2: TC distraction-  MT 3: Hip LAD  MT 4: Calcaneal sub talar eversion mobilizations- 30x grade 4  Balance/Neuromuscular Re-Education  NMR 1: nerve glides- 2x15  NMR 3: ankle dorsiflexion mobilizations- 30 times with a band  NMR 4: 5x5 with 5 second hold-  NMR 5: 2 up 1 down calf raises- 3x12  NMR 6: Squats to 18 inch surface 3x10 BW with heel lift  NMR 7: nerve glides at elbow for median nerve- 2x15    Time Entry(in minutes):  Manual Therapy Time Entry: 15  Neuromuscular Re-Education Time Entry: 45    Assessment & Plan   Assessment: Pt says things are feeling the same but he is not demonstrating pain when he is in clinic. We gave him nerve glides to assist hi L elbow today and will see how this assist  his symptoms at that elbow.  Evaluation/Treatment Tolerance: Patient tolerated treatment well    Patient will continue to benefit from skilled outpatient physical therapy to address the deficits listed in the problem list box on initial evaluation, provide pt/family education and to maximize pt's level of independence in the home and community environment.     Patient's spiritual, cultural, and educational needs considered and patient agreeable to plan of care and goals.           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  4. Pt will report at CJ level (20-40% impaired) on FOTO knee to demonstrate increase in LE function with every day tasks.             STG       1.Report decreased achilles pain  < / =  4/10  to increase tolerance for walking longer        Start:  04/12/25    Expected End:  05/10/25       2. Increase ankle ROM to equal to contralateral LE in order to be able to perform ADLs without difficulty. 3. Increase strength by 1/3 MMT grade in hip abduction  to increase tolerance for ADL and work activities. 4. Pt to tolerate HEP to improve ROM and independence with ADL's             Rajinder Cobos PT

## 2025-05-12 ENCOUNTER — CLINICAL SUPPORT (OUTPATIENT)
Dept: REHABILITATION | Facility: OTHER | Age: 50
End: 2025-05-12
Payer: MEDICAID

## 2025-05-12 DIAGNOSIS — M25.674 DECREASED RANGE OF MOTION OF RIGHT FOOT: Primary | ICD-10-CM

## 2025-05-12 DIAGNOSIS — R29.3 POSTURAL IMBALANCE: ICD-10-CM

## 2025-05-12 PROCEDURE — 97110 THERAPEUTIC EXERCISES: CPT | Mod: PN

## 2025-05-12 NOTE — PROGRESS NOTES
Outpatient Rehab    Physical Therapy Visit    Patient Name: Grayson Zapata  MRN: [id number]  YOB: 1975  Encounter Date: 5/12/2025    Therapy Diagnosis:   Encounter Diagnoses   Name Primary?    Decreased range of motion of right foot Yes    Postural imbalance        Physician: Promise Colon F*    Physician Orders: Eval and Treat  Medical Diagnosis: Plantar fasciitis of right foot    Visit # / Visits Authorized:  5 / 16  Insurance Authorization Period: 4/7/2025 to 7/15/2025  Date of Evaluation: 4/7/2025  Plan of Care Certification: 4/7/2025 to 7/7/2025      PT/PTA:     Number of PTA visits since last PT visit:   Time In: 1500   Time Out: 1600  Total Time: 60   Total Billable Time: 60    FOTO:  Intake Score:  %  Survey Score 1:  %  Survey Score 2:  %         Subjective   Feels like days have been good since he last saw me and he is managing well but during session he continues to have stiffness in R leg with hops. TC distraction decreased his single leg hop ability and after performing eversion mobilizations his single leg hop ability decreased further..  Pain reported as 1/10.      Objective            Treatment:  Therapeutic Exercise  TE 1: thoracic extx30  TE 2: thoracic rotation- 2x20  TE 3: bike 8 min with level 5 resistance  TE 4: DF mobs- 30 times  TE 5: Banded side walks with RTB around mid shins 5x5  TE 6: Heel elevated squat- 3x12 with 15 lb kb  TE 7: Heel raises on incline- 3x12  Manual Therapy  MT 1: test and retest for heel pain- subtalar whip  MT 2: TC distraction-  MT 3: Hip LAD  MT 4: Calcaneal sub talar eversion mobilizations- 30x grade 4      Time Entry(in minutes):       Assessment & Plan   Assessment: Feels like days have been good since he last saw me and he is managing well but during session he continues to have stiffness in R leg with hops. TC distraction decreased his single leg hop ability and after performing eversion mobilizations his single leg hop ability decreased  further.  Evaluation/Treatment Tolerance: Patient tolerated treatment well    Patient will continue to benefit from skilled outpatient physical therapy to address the deficits listed in the problem list box on initial evaluation, provide pt/family education and to maximize pt's level of independence in the home and community environment.     Patient's spiritual, cultural, and educational needs considered and patient agreeable to plan of care and goals.           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  4. Pt will report at CJ level (20-40% impaired) on FOTO knee to demonstrate increase in LE function with every day tasks.             STG       1.Report decreased achilles pain  < / =  4/10  to increase tolerance for walking longer        Start:  04/12/25    Expected End:  05/10/25       2. Increase ankle ROM to equal to contralateral LE in order to be able to perform ADLs without difficulty. 3. Increase strength by 1/3 MMT grade in hip abduction  to increase tolerance for ADL and work activities. 4. Pt to tolerate HEP to improve ROM and independence with ADL's             Rajinder Cobos PT

## 2025-05-19 ENCOUNTER — CLINICAL SUPPORT (OUTPATIENT)
Dept: REHABILITATION | Facility: OTHER | Age: 50
End: 2025-05-19
Payer: MEDICAID

## 2025-05-19 DIAGNOSIS — M25.674 DECREASED RANGE OF MOTION OF RIGHT FOOT: Primary | ICD-10-CM

## 2025-05-19 DIAGNOSIS — R29.3 POSTURAL IMBALANCE: ICD-10-CM

## 2025-05-19 PROCEDURE — 97110 THERAPEUTIC EXERCISES: CPT | Mod: PN

## 2025-05-19 NOTE — PROGRESS NOTES
Outpatient Rehab    Physical Therapy Visit    Patient Name: Grayson Zapata  MRN: [id number]  YOB: 1975  Encounter Date: 5/19/2025    Therapy Diagnosis:   Encounter Diagnoses   Name Primary?    Decreased range of motion of right foot Yes    Postural imbalance        Physician: Promise Colon F*    Physician Orders: Eval and Treat  Medical Diagnosis: Plantar fasciitis of right foot    Visit # / Visits Authorized:  6 / 16  Insurance Authorization Period: 4/7/2025 to 7/15/2025  Date of Evaluation: 4/7/2025  Plan of Care Certification: 4/7/2025 to 7/7/2025      PT/PTA:     Number of PTA visits since last PT visit:   Time In: 1500   Time Out: 1600  Total Time: 60   Total Billable Time: 30    FOTO:  Intake Score:  %  Survey Score 1:  %  Survey Score 2:  %         Subjective             Objective            Treatment:  Therapeutic Exercise  TE 3: bike 8 min with level 5 resistance  TE 4: DF mobs- 30 times  TE 5: Banded side walks with RTB around mid shins 5x5  TE 6: Heel elevated squat- 3x12 with 15 lb kb  Balance/Neuromuscular Re-Education  NMR 1: nerve glides- 2x15  NMR 2: single leg hip airplanes- 2x8 each leg  NMR 3: side lying er- 2x12  NMR 7: nerve glides at elbow for median nerve- 2x15      Time Entry(in minutes):  Therapeutic Exercise Time Entry: 30    Assessment & Plan   Assessment: Grayson demonstrates continued fluctuating symptoms but has overall demonstrated functional improvements. He is able to comprehend importance of hip and lumbar spine when treating his R LE and noticed improvement in symptoms today after a long axis hip distraction. Pt reports symptoms now most noticible at night when laying in bed indicating further possibility of neural entrapment at spinal level.  Evaluation/Treatment Tolerance: Patient tolerated treatment well    Patient will continue to benefit from skilled outpatient physical therapy to address the deficits listed in the problem list box on initial evaluation,  provide pt/family education and to maximize pt's level of independence in the home and community environment.     Patient's spiritual, cultural, and educational needs considered and patient agreeable to plan of care and goals.           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  4. Pt will report at CJ level (20-40% impaired) on FOTO knee to demonstrate increase in LE function with every day tasks.             STG       1.Report decreased achilles pain  < / =  4/10  to increase tolerance for walking longer        Start:  04/12/25    Expected End:  05/10/25       2. Increase ankle ROM to equal to contralateral LE in order to be able to perform ADLs without difficulty. 3. Increase strength by 1/3 MMT grade in hip abduction  to increase tolerance for ADL and work activities. 4. Pt to tolerate HEP to improve ROM and independence with ADL's             Rajinder Cobos, PT

## 2025-05-21 ENCOUNTER — CLINICAL SUPPORT (OUTPATIENT)
Dept: REHABILITATION | Facility: OTHER | Age: 50
End: 2025-05-21
Payer: MEDICAID

## 2025-05-21 DIAGNOSIS — M25.674 DECREASED RANGE OF MOTION OF RIGHT FOOT: Primary | ICD-10-CM

## 2025-05-21 DIAGNOSIS — R29.3 POSTURAL IMBALANCE: ICD-10-CM

## 2025-05-21 PROCEDURE — 97110 THERAPEUTIC EXERCISES: CPT | Mod: PN | Performed by: PHYSICAL THERAPIST

## 2025-05-26 NOTE — PROGRESS NOTES
Outpatient Rehab    Physical Therapy Visit    Patient Name: Grayson Zapata  MRN: [id number]  YOB: 1975  Encounter Date: 5/21/2025    Therapy Diagnosis:   Encounter Diagnoses   Name Primary?    Decreased range of motion of right foot Yes    Postural imbalance        Physician: Promise Colon F*    Physician Orders: Eval and Treat  Medical Diagnosis: Plantar fasciitis of right foot    Visit # / Visits Authorized:  7 / 16  Insurance Authorization Period: 4/7/2025 to 7/15/2025  Date of Evaluation: 4/7/2025  Plan of Care Certification: 4/7/2025 to 7/7/2025      PT/PTA:     Number of PTA visits since last PT visit:   Time In: 1305   Time Out: 1401  Total Time: 56   Total Billable Time: 56    FOTO:  Intake Score:  %  Survey Score 1:  %  Survey Score 2:  %         Subjective   only pain when laying down and he fixes it with nerve glides.  Pain reported as 1/10.      Objective            Treatment:  Therapeutic Exercise  TE 4: DF mobs- 30 times  TE 5: Banded side walks with RTB around mid shins 5x5  TE 7: Heel raises on incline- 3x12  Manual Therapy  MT 1: test and retest for heel pain- df mobilizations 5 min  MT 2: TC distraction-  Balance/Neuromuscular Re-Education  NMR 3: side lying er- 2x12  NMR 4: 5x5 with 5 second hold-  NMR 5: 2 up 1 down calf raises- 3x12  NMR 6: Squats to 18 inch surface 3x10 BW with heel lift  NMR 7: foot domingn excercise and eductaion- 10 for 10 second holds      Time Entry(in minutes):  Therapeutic Exercise Time Entry: 56    Assessment & Plan   Assessment: Grayson has demonstrated ioverall functional improvement and was educated about process of discharging soon. We were unable to find an astrict sign today and got all the way up to single leg hop. He recieved education well. Grayson demonstrates foot intrinsic weakness and difficulty activating muscles to create dome with his intrinsics.  Evaluation/Treatment Tolerance: Patient tolerated treatment well    Patient will continue  to benefit from skilled outpatient physical therapy to address the deficits listed in the problem list box on initial evaluation, provide pt/family education and to maximize pt's level of independence in the home and community environment.     Patient's spiritual, cultural, and educational needs considered and patient agreeable to plan of care and goals.           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  4. Pt will report at CJ level (20-40% impaired) on FOTO knee to demonstrate increase in LE function with every day tasks.             STG       1.Report decreased achilles pain  < / =  4/10  to increase tolerance for walking longer        Start:  04/12/25    Expected End:  05/10/25       2. Increase ankle ROM to equal to contralateral LE in order to be able to perform ADLs without difficulty. 3. Increase strength by 1/3 MMT grade in hip abduction  to increase tolerance for ADL and work activities. 4. Pt to tolerate HEP to improve ROM and independence with ADL's             Rajinder Cobos, PT          Nel Roy, PT, DPT  Board Certified in Orthopedic Physical Therapy  Fellow of [employer]

## 2025-05-28 ENCOUNTER — CLINICAL SUPPORT (OUTPATIENT)
Dept: REHABILITATION | Facility: OTHER | Age: 50
End: 2025-05-28
Payer: MEDICAID

## 2025-05-28 DIAGNOSIS — R29.3 POSTURAL IMBALANCE: ICD-10-CM

## 2025-05-28 DIAGNOSIS — M25.674 DECREASED RANGE OF MOTION OF RIGHT FOOT: Primary | ICD-10-CM

## 2025-05-28 PROCEDURE — 97110 THERAPEUTIC EXERCISES: CPT | Mod: PN

## 2025-05-28 NOTE — PROGRESS NOTES
Outpatient Rehab    Physical Therapy Visit    Patient Name: Grayson Zapata  MRN: [id number]  YOB: 1975  Encounter Date: 5/28/2025    Therapy Diagnosis:   Encounter Diagnoses   Name Primary?    Decreased range of motion of right foot Yes    Postural imbalance        Physician: Promise Colon F*    Physician Orders: Eval and Treat  Medical Diagnosis: Plantar fasciitis of right foot    Visit # / Visits Authorized:  8 / 16  Insurance Authorization Period: 4/7/2025 to 7/15/2025  Date of Evaluation: 4/7/2025  Plan of Care Certification: 4/7/2025 to 7/7/2025      PT/PTA:     Number of PTA visits since last PT visit:   Time In: 1420   Time Out: 1500  Total Time: 40   Total Billable Time: 30    FOTO:  Intake Score:  %  Survey Score 1:  %  Survey Score 2:  %         Subjective   continiues to have pain laying down but has recently not been bothering him too much outside of night time..         Objective            Treatment:  Therapeutic Exercise  TE 4: DF mobs- 30 times  TE 5: Banded side walks with RTB around mid shins 5x5  TE 6: Heel elevated squat- 3x12 with 15 lb kb  TE 7: Heel raises on incline- 3x12  Balance/Neuromuscular Re-Education  NMR 1: nerve glides- 2x15  NMR 2: Hip hinge with DL support (right foot forward.  NMR 3: side lying er- 2x12  NMR 4: 5x5 with 5 second hold squat  NMR 5: intrinsic foot training- 10x10 sec holds      Time Entry(in minutes):  Therapeutic Exercise Time Entry: 30    Assessment & Plan   Assessment: Grayson comes in today with managible symptoms he reports, but he is unable to hop more than 5 times on single leg. He feels okay hopping on double leg but when we go to single leg he feels symptoms. Pt will benefit for one further session to eliminate remaining symptoms and perform discharge measurments.  Evaluation/Treatment Tolerance: Patient tolerated treatment well    Patient will continue to benefit from skilled outpatient physical therapy to address the deficits listed in  the problem list box on initial evaluation, provide pt/family education and to maximize pt's level of independence in the home and community environment.     Patient's spiritual, cultural, and educational needs considered and patient agreeable to plan of care and goals.           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  4. Pt will report at CJ level (20-40% impaired) on FOTO knee to demonstrate increase in LE function with every day tasks.             STG       1.Report decreased achilles pain  < / =  4/10  to increase tolerance for walking longer        Start:  04/12/25    Expected End:  05/10/25       2. Increase ankle ROM to equal to contralateral LE in order to be able to perform ADLs without difficulty. 3. Increase strength by 1/3 MMT grade in hip abduction  to increase tolerance for ADL and work activities. 4. Pt to tolerate HEP to improve ROM and independence with ADL's             Rajinder Cobos, PT

## 2025-06-03 ENCOUNTER — CLINICAL SUPPORT (OUTPATIENT)
Dept: REHABILITATION | Facility: OTHER | Age: 50
End: 2025-06-03
Payer: MEDICAID

## 2025-06-03 DIAGNOSIS — R29.3 POSTURAL IMBALANCE: ICD-10-CM

## 2025-06-03 DIAGNOSIS — M25.674 DECREASED RANGE OF MOTION OF RIGHT FOOT: Primary | ICD-10-CM

## 2025-06-03 PROCEDURE — 97110 THERAPEUTIC EXERCISES: CPT | Mod: PN

## 2025-06-09 NOTE — PROGRESS NOTES
Outpatient Rehab    Physical Therapy Visit    Patient Name: Grayson Zapata  MRN: [id number]  YOB: 1975  Encounter Date: 6/3/2025    Therapy Diagnosis:   Encounter Diagnoses   Name Primary?    Decreased range of motion of right foot Yes    Postural imbalance        Physician: Promise Colon F*    Physician Orders: Eval and Treat  Medical Diagnosis: Plantar fasciitis of right foot    Visit # / Visits Authorized:  9 / 16  Insurance Authorization Period: 4/7/2025 to 7/15/2025  Date of Evaluation: 4/7/2025  Plan of Care Certification: 4/7/2025 to 7/7/2025      PT/PTA:     Number of PTA visits since last PT visit:   Time In: 1300   Time Out: 1330  Total Time: 30   Total Billable Time: 30    FOTO:  Intake Score:  %  Survey Score 1:  %  Survey Score 2:  %         Subjective   feels like he is too busy to keep up with therapy and feels like he should get needled instead.  Pain reported as 1/10. heel pain at night that reduces with nerve glides    Objective       Ankle/Foot Range of Motion   Right Ankle/Foot   Active (deg) Passive (deg) Pain   Dorsiflexion (KE)         Dorsiflexion (KF) 0       Plantar Flexion         Ankle Inversion         Ankle Eversion 15       Subtalar Inversion         Subtalar Eversion         Great Toe MTP Flexion         Great Toe MTP Extension 20       Great Toe IP Flexion                           Treatment:  Therapeutic Exercise  TE 4: DF mobs- 30 times  TE 5: Banded side walks with RTB around mid shins 5x5  TE 6: Heel elevated squat- 3x12 with 15 lb kb  TE 7: Heel raises on incline- 3x12        Time Entry(in minutes):  Manual Therapy Time Entry: 15  Neuromuscular Re-Education Time Entry: 15    Assessment & Plan   Assessment: Grayson attends PT today and states he feels like he needs to go to a passive form of therapy because he cannot commit to the time PT takes and excercises. Pt was educated about his improvement so far and encouraged to understand the progress he has made.  Patient progressed in all of our golas and met all short term goals but continues to still have pain occasionally. Patient educated about HEP and continues therapy for his foot.  Evaluation/Treatment Tolerance: Patient tolerated treatment well    Patient will continue to benefit from skilled outpatient physical therapy to address the deficits listed in the problem list box on initial evaluation, provide pt/family education and to maximize pt's level of independence in the home and community environment.     Patient's spiritual, cultural, and educational needs considered and patient agreeable to plan of care and goals.           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  4. Pt will report at CJ level (20-40% impaired) on FOTO knee to demonstrate increase in LE function with every day tasks.             STG       1.Report decreased achilles pain  < / =  4/10  to increase tolerance for walking longer        Start:  04/12/25    Expected End:  05/10/25       2. Increase ankle ROM to equal to contralateral LE in order to be able to perform ADLs without difficulty. 3. Increase strength by 1/3 MMT grade in hip abduction  to increase tolerance for ADL and work activities. 4. Pt to tolerate HEP to improve ROM and independence with ADL's             Rajinder Cobos, PT